# Patient Record
Sex: MALE | Race: WHITE | NOT HISPANIC OR LATINO
[De-identification: names, ages, dates, MRNs, and addresses within clinical notes are randomized per-mention and may not be internally consistent; named-entity substitution may affect disease eponyms.]

---

## 2017-01-03 ENCOUNTER — RESULT REVIEW (OUTPATIENT)
Age: 62
End: 2017-01-03

## 2017-01-04 ENCOUNTER — APPOINTMENT (OUTPATIENT)
Dept: NEUROLOGY | Facility: CLINIC | Age: 62
End: 2017-01-04

## 2017-01-04 ENCOUNTER — APPOINTMENT (OUTPATIENT)
Dept: HEMATOLOGY ONCOLOGY | Facility: CLINIC | Age: 62
End: 2017-01-04

## 2017-01-04 ENCOUNTER — APPOINTMENT (OUTPATIENT)
Dept: MRI IMAGING | Facility: IMAGING CENTER | Age: 62
End: 2017-01-04

## 2017-01-04 ENCOUNTER — OUTPATIENT (OUTPATIENT)
Dept: OUTPATIENT SERVICES | Facility: HOSPITAL | Age: 62
LOS: 1 days | Discharge: HOME | End: 2017-01-04

## 2017-01-04 ENCOUNTER — OUTPATIENT (OUTPATIENT)
Dept: OUTPATIENT SERVICES | Facility: HOSPITAL | Age: 62
LOS: 1 days | End: 2017-01-04
Payer: COMMERCIAL

## 2017-01-04 VITALS — RESPIRATION RATE: 16 BRPM | WEIGHT: 150 LBS | BODY MASS INDEX: 20.32 KG/M2 | HEIGHT: 72 IN

## 2017-01-04 DIAGNOSIS — D64.9 ANEMIA, UNSPECIFIED: ICD-10-CM

## 2017-01-04 DIAGNOSIS — Z00.8 ENCOUNTER FOR OTHER GENERAL EXAMINATION: ICD-10-CM

## 2017-01-04 LAB
BASOPHILS # BLD AUTO: 0 K/UL — SIGNIFICANT CHANGE UP (ref 0–0.2)
BASOPHILS NFR BLD AUTO: 0.6 % — SIGNIFICANT CHANGE UP (ref 0–2)
EOSINOPHIL # BLD AUTO: 0.1 K/UL — SIGNIFICANT CHANGE UP (ref 0–0.5)
EOSINOPHIL NFR BLD AUTO: 1.9 % — SIGNIFICANT CHANGE UP (ref 0–6)
HCT VFR BLD CALC: 38.9 % — LOW (ref 39–50)
HGB BLD-MCNC: 13.3 G/DL — SIGNIFICANT CHANGE UP (ref 13–17)
LYMPHOCYTES # BLD AUTO: 1.4 K/UL — SIGNIFICANT CHANGE UP (ref 1–3.3)
LYMPHOCYTES # BLD AUTO: 35.5 % — SIGNIFICANT CHANGE UP (ref 13–44)
MCHC RBC-ENTMCNC: 31.5 PG — SIGNIFICANT CHANGE UP (ref 27–34)
MCHC RBC-ENTMCNC: 34.1 GM/DL — SIGNIFICANT CHANGE UP (ref 32–36)
MCV RBC AUTO: 92.4 FL — SIGNIFICANT CHANGE UP (ref 80–100)
MONOCYTES # BLD AUTO: 0.4 K/UL — SIGNIFICANT CHANGE UP (ref 0–0.9)
MONOCYTES NFR BLD AUTO: 10.5 % — SIGNIFICANT CHANGE UP (ref 2–14)
NEUTROPHILS # BLD AUTO: 2 K/UL — SIGNIFICANT CHANGE UP (ref 1.8–7.4)
NEUTROPHILS NFR BLD AUTO: 51.5 % — SIGNIFICANT CHANGE UP (ref 43–77)
PLATELET # BLD AUTO: 209 K/UL — SIGNIFICANT CHANGE UP (ref 150–400)
RBC # BLD: 4.21 M/UL — SIGNIFICANT CHANGE UP (ref 4.2–5.8)
RBC # FLD: 12.8 % — SIGNIFICANT CHANGE UP (ref 10.3–14.5)
WBC # BLD: 3.8 K/UL — SIGNIFICANT CHANGE UP (ref 3.8–10.5)
WBC # FLD AUTO: 3.8 K/UL — SIGNIFICANT CHANGE UP (ref 3.8–10.5)

## 2017-01-04 PROCEDURE — 70553 MRI BRAIN STEM W/O & W/DYE: CPT

## 2017-01-04 PROCEDURE — A9585: CPT

## 2017-01-04 RX ORDER — AMANTADINE HYDROCHLORIDE 100 MG/1
100 CAPSULE ORAL TWICE DAILY
Qty: 60 | Refills: 1 | Status: ACTIVE | COMMUNITY
Start: 2017-01-04 | End: 1900-01-01

## 2017-01-05 ENCOUNTER — CHART COPY (OUTPATIENT)
Age: 62
End: 2017-01-05

## 2017-01-10 ENCOUNTER — CHART COPY (OUTPATIENT)
Age: 62
End: 2017-01-10

## 2017-04-06 ENCOUNTER — EMERGENCY (EMERGENCY)
Facility: HOSPITAL | Age: 62
LOS: 1 days | End: 2017-04-06
Payer: COMMERCIAL

## 2017-04-06 ENCOUNTER — INPATIENT (INPATIENT)
Facility: HOSPITAL | Age: 62
LOS: 7 days | Discharge: NURSING, REHAB OR LONG-TERM CARE | DRG: 917 | End: 2017-04-14
Attending: PSYCHIATRY & NEUROLOGY | Admitting: PSYCHIATRY & NEUROLOGY
Payer: COMMERCIAL

## 2017-04-06 ENCOUNTER — TRANSCRIPTION ENCOUNTER (OUTPATIENT)
Age: 62
End: 2017-04-06

## 2017-04-06 VITALS
DIASTOLIC BLOOD PRESSURE: 84 MMHG | HEART RATE: 86 BPM | OXYGEN SATURATION: 100 % | RESPIRATION RATE: 18 BRPM | TEMPERATURE: 98 F | SYSTOLIC BLOOD PRESSURE: 138 MMHG

## 2017-04-06 DIAGNOSIS — G93.9 DISORDER OF BRAIN, UNSPECIFIED: ICD-10-CM

## 2017-04-06 DIAGNOSIS — I82.409 ACUTE EMBOLISM AND THROMBOSIS OF UNSPECIFIED DEEP VEINS OF UNSPECIFIED LOWER EXTREMITY: ICD-10-CM

## 2017-04-06 DIAGNOSIS — T42.0X1A POISONING BY HYDANTOIN DERIVATIVES, ACCIDENTAL (UNINTENTIONAL), INITIAL ENCOUNTER: ICD-10-CM

## 2017-04-06 LAB
ANION GAP SERPL CALC-SCNC: 12 MMOL/L — SIGNIFICANT CHANGE UP (ref 5–17)
ANION GAP SERPL CALC-SCNC: 8 MMOL/L — SIGNIFICANT CHANGE UP (ref 5–17)
APTT BLD: 21.3 SEC — LOW (ref 27.5–37.4)
BUN SERPL-MCNC: 6 MG/DL — LOW (ref 7–23)
BUN SERPL-MCNC: 8 MG/DL — SIGNIFICANT CHANGE UP (ref 7–23)
CALCIUM SERPL-MCNC: 8.8 MG/DL — SIGNIFICANT CHANGE UP (ref 8.4–10.5)
CALCIUM SERPL-MCNC: 9 MG/DL — SIGNIFICANT CHANGE UP (ref 8.4–10.5)
CHLORIDE SERPL-SCNC: 103 MMOL/L — SIGNIFICANT CHANGE UP (ref 96–108)
CHLORIDE SERPL-SCNC: 105 MMOL/L — SIGNIFICANT CHANGE UP (ref 96–108)
CO2 SERPL-SCNC: 24 MMOL/L — SIGNIFICANT CHANGE UP (ref 22–31)
CO2 SERPL-SCNC: 29 MMOL/L — SIGNIFICANT CHANGE UP (ref 22–31)
CREAT SERPL-MCNC: 0.48 MG/DL — LOW (ref 0.5–1.3)
CREAT SERPL-MCNC: 0.56 MG/DL — SIGNIFICANT CHANGE UP (ref 0.5–1.3)
GLUCOSE SERPL-MCNC: 119 MG/DL — HIGH (ref 70–99)
GLUCOSE SERPL-MCNC: 122 MG/DL — HIGH (ref 70–99)
HCT VFR BLD CALC: 25.3 % — LOW (ref 39–50)
HGB BLD-MCNC: 7.4 G/DL — LOW (ref 13–17)
INR BLD: 0.94 RATIO — SIGNIFICANT CHANGE UP (ref 0.88–1.16)
MCHC RBC-ENTMCNC: 29.4 GM/DL — LOW (ref 32–36)
MCHC RBC-ENTMCNC: 31.4 PG — SIGNIFICANT CHANGE UP (ref 27–34)
MCV RBC AUTO: 107 FL — HIGH (ref 80–100)
PLATELET # BLD AUTO: 102 K/UL — LOW (ref 150–400)
POTASSIUM SERPL-MCNC: 4.2 MMOL/L — SIGNIFICANT CHANGE UP (ref 3.5–5.3)
POTASSIUM SERPL-MCNC: 8.3 MMOL/L — CRITICAL HIGH (ref 3.5–5.3)
POTASSIUM SERPL-SCNC: 4.2 MMOL/L — SIGNIFICANT CHANGE UP (ref 3.5–5.3)
POTASSIUM SERPL-SCNC: 8.3 MMOL/L — CRITICAL HIGH (ref 3.5–5.3)
PROTHROM AB SERPL-ACNC: 10.2 SEC — SIGNIFICANT CHANGE UP (ref 9.8–12.7)
RBC # BLD: 2.37 M/UL — LOW (ref 4.2–5.8)
RBC # FLD: 12.2 % — SIGNIFICANT CHANGE UP (ref 10.3–14.5)
SODIUM SERPL-SCNC: 139 MMOL/L — SIGNIFICANT CHANGE UP (ref 135–145)
SODIUM SERPL-SCNC: 142 MMOL/L — SIGNIFICANT CHANGE UP (ref 135–145)
WBC # BLD: 3.8 K/UL — SIGNIFICANT CHANGE UP (ref 3.8–10.5)
WBC # FLD AUTO: 3.8 K/UL — SIGNIFICANT CHANGE UP (ref 3.8–10.5)

## 2017-04-06 PROCEDURE — 93010 ELECTROCARDIOGRAM REPORT: CPT

## 2017-04-06 PROCEDURE — 71010: CPT | Mod: 26

## 2017-04-06 PROCEDURE — 99291 CRITICAL CARE FIRST HOUR: CPT

## 2017-04-06 PROCEDURE — 70450 CT HEAD/BRAIN W/O DYE: CPT | Mod: 26,77

## 2017-04-06 PROCEDURE — 72125 CT NECK SPINE W/O DYE: CPT | Mod: 26

## 2017-04-06 PROCEDURE — 99285 EMERGENCY DEPT VISIT HI MDM: CPT | Mod: 25

## 2017-04-06 PROCEDURE — 72170 X-RAY EXAM OF PELVIS: CPT | Mod: 26

## 2017-04-06 PROCEDURE — 70450 CT HEAD/BRAIN W/O DYE: CPT | Mod: 26

## 2017-04-06 RX ORDER — AMANTADINE HCL 100 MG
100 CAPSULE ORAL DAILY
Qty: 0 | Refills: 0 | Status: DISCONTINUED | OUTPATIENT
Start: 2017-04-06 | End: 2017-04-14

## 2017-04-06 RX ORDER — ACETAMINOPHEN 500 MG
1000 TABLET ORAL ONCE
Qty: 0 | Refills: 0 | Status: COMPLETED | OUTPATIENT
Start: 2017-04-06 | End: 2017-04-06

## 2017-04-06 RX ORDER — ENOXAPARIN SODIUM 100 MG/ML
40 INJECTION SUBCUTANEOUS EVERY 24 HOURS
Qty: 0 | Refills: 0 | Status: DISCONTINUED | OUTPATIENT
Start: 2017-04-06 | End: 2017-04-14

## 2017-04-06 RX ORDER — VALPROIC ACID (AS SODIUM SALT) 250 MG/5ML
250 SOLUTION, ORAL ORAL EVERY 8 HOURS
Qty: 0 | Refills: 0 | Status: DISCONTINUED | OUTPATIENT
Start: 2017-04-06 | End: 2017-04-07

## 2017-04-06 RX ORDER — VALPROIC ACID (AS SODIUM SALT) 250 MG/5ML
250 SOLUTION, ORAL ORAL EVERY 8 HOURS
Qty: 0 | Refills: 0 | Status: DISCONTINUED | OUTPATIENT
Start: 2017-04-06 | End: 2017-04-06

## 2017-04-06 RX ADMIN — Medication 400 MILLIGRAM(S): at 20:44

## 2017-04-06 RX ADMIN — Medication 1000 MILLIGRAM(S): at 21:19

## 2017-04-06 NOTE — H&P ADULT. - MENTAL STATUS
awake and alert awake and alert, with intermittent staring episodes. When questions are asked, he often answers, although not always accurately. He has a disinhibited affect. During staring spells, he does not blink to visual threat. (+) grasp on the right.

## 2017-04-06 NOTE — ED ADULT NURSE REASSESSMENT NOTE - NS ED NURSE REASSESS COMMENT FT1
Pt sleeping in room, no complaints at this time, VSS, Iv site clear fluids running, waiting for room disposition, pt at baseline, a&ox2.

## 2017-04-06 NOTE — H&P ADULT. - PROBLEM SELECTOR PLAN 1
hold dilantin  [] free and serum dilantin levels stat and in am hold dilantin  [] free and serum dilantin levels

## 2017-04-06 NOTE — ED PROVIDER NOTE - ATTENDING CONTRIBUTION TO CARE
62f pmhx oligoastrocytoma s/p multiple resections, seizure disorder on valproic acid, HLD, transferred from OSH for worsening brain mass size on incidental head CT to r/o intracranial injury s/p mechanical fall. pt reported to have increasingly ataxia and aggression lately, today lost balance and fell, denies LOC (is currently AOx4). pt accepted for transfer by neurosurg and neuro. pt denies any pain. on PE, VSS, RRR, CTA b/l, abdo soft, non-TTP, not following commands however AOx4.

## 2017-04-06 NOTE — H&P ADULT. - RADIOLOGY RESULTS AND INTERPRETATION
CT reviewed in neuroradiology suite with the neuroradiologist. It demonstrates disease progression through the craniotomy site on the right, under the scalp. There is recurrent hyperdense tumor. There is a subdural hygroma, with contiguiity with the underlying ventricle. however, there is also right to left shift, so it is unclear how much pressure the left hemisphere is experiencing.

## 2017-04-06 NOTE — H&P ADULT. - ASSESSMENT
61 yo male with 3 weeks of lethargy and falls. No increase in size of tumor. Dilantin level elevated. 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

## 2017-04-06 NOTE — ED PROVIDER NOTE - MEDICAL DECISION MAKING DETAILS
Neuro and NSx consult, new edema/masses on CT head. no concern for herniation, is more confused per discussion with wife on phone. will repeat head CT. basic labs. TBA

## 2017-04-06 NOTE — H&P ADULT. - MOTOR
Motor: 5/5 strength in all extremities, normal tone and bulk.  Deep tendon reflexes:   Biceps right 2+. Biceps left 2+  Patella right 2+. Patella left 2+. psychomotor slowing  Motor: 5/5 strength in all extremities, normal tone and bulk.  Deep tendon reflexes:   Biceps right 2+. Biceps left 2+  Patella right 2+. Patella left 2+.

## 2017-04-06 NOTE — ED ADULT NURSE NOTE - PMH
Brain Tumor  Oligoastrocytoma- originally diagnosed 1994, s/p multiple resections, radiation therapy 2006, chemotherapy Oral Temodar  (last dose 2006) Avastatin 2014  Dyslipidemia    EP (Epilepsy)  1994 last seizure 1998  Hyperlipidemia    Renal calculus

## 2017-04-06 NOTE — ED ADULT NURSE NOTE - OBJECTIVE STATEMENT
62yr male presented to ED by EMS transfer from [facility].  Pt has a hx of brain CA. 62yr male presented to ED by EMS transfer from [facility] with 20g in LAC.  Pt has a hx of brain CA and frequent falls.  Pt reports falling today and home, reports falling more frequently, unsteady gait worsening. Pt reports HA now, no weakness, no chest pain, no sob, no paresthesias, no cough/chills, no problems with urination or bowel movements.  Per Pt's wife, Pt is often combative/ignores people around him, very disengaged at time, Wife reports Pt was confused today, thought it was July, and reports Pt is usually not like this at all, Per wife rt eye lag not his baseline, skin warm dry & intact.    HIV:

## 2017-04-06 NOTE — H&P ADULT. - HISTORY OF PRESENT ILLNESS
63 yo male with history of anasplastic glioma since 1994. S/p multiple craniectomies and chemotherapy. Currently not on chemotherapy. Follows with Dr. Dumont. For the last 2-3 weeks he has had multiple falls daily in and out of his house. Was able to work. Today his wife found him on the floor and he was not able to get up. PAtient also increasingly altered today and not responding to questions as usual. Patient went to [facility]. CT head showed a stable tumor. Phenytoin level 24.9 and depakote level 0. Patient transferred to [facility] for further workup. 61 yo male with history of right frontal anaplastic glioma since 1994. S/p multiple craniectomies and chemotherapy. Currently not on chemotherapy. Follows with Dr. Dumont. For the last 2-3 weeks he has had multiple falls daily in and out of his house. Was able to perform modified work duties.     he was staying at home. His wife left in the AM and retruned in the PM to find him on the floor and he was not able to get up. Patient also increasingly altered today and not responding to questions as usual. Intermittent twitching of the left hand and staring spells noted. Wife called 911. Patient taken first to [facility], then transferred to [facility].     CT head showed new subdural hygroma as well as some hyperdense tumor involving the right frontal lobe, likely progressive, but challenging due to complex imaging (multiple procedures).     Seizures are managed by Dr. England. However, patient is mostly responsible for taking his own meds. Moreover, wife is unsure how much of each medication he has been taking and pill bottles are mixed up, with all different drugs in a single bottle. Labs revealed Phenytoin level 24.9 and depakote level low.     Patient transferred to [facility] for further workup.

## 2017-04-06 NOTE — ED PROVIDER NOTE - OBJECTIVE STATEMENT
61yo M with frequent falls, worsening gait instability, h/o brain CA s/p craniotomy unclear further tx, family not present at bedside. Complaint of headache right now. no weakness. no CP/SOB. no abd pain. no extremity pain. no paresthesias. no dysuria. no fever/chills. Transferred from [facility] for continuity of care.    Per wife MS- often combative/ignores people around him/very disengaged at times. Normally not confused, today thought July. Was even working today at [employer]. Per wife rt eye lag not his baseline.

## 2017-04-06 NOTE — ED ADULT NURSE NOTE - PSH
S/P Craniotomy and excision of brain tumor  x 6  secondary to brain neoplasm (1994, 2005, 2007, 2011 2012,2014  s/p lithotripsy

## 2017-04-06 NOTE — H&P ADULT. - CRANIAL NERVE
Cranial Nerves: Visual acuity intact bilaterally, visual fields full to confrontation, pupils equal round and reactive to light, extraocular motion intact, facial sensation intact symmetrically in V1-V3 bilaterally, face symmetrical, hearing was intact bilaterally, head turning and shoulder shrug symmetric and there was no tongue deviation with protrusion. Cranial Nerves: Visual acuity intact bilaterally, visual fields full to confrontation, pupils equal round and reactive to light, extraocular motion intact, but difficult to get him to comply enough with the exam to evaluate for nystagmus, facial sensation intact symmetrically in V1-V3 bilaterally, face symmetrical, hearing was intact bilaterally, head turning and shoulder shrug symmetric and there was no tongue deviation with protrusion.

## 2017-04-06 NOTE — ED PROVIDER NOTE - PHYSICAL EXAMINATION
Gen: NAD, AOx2, lethargic speech, cachectic  Head: 4x4cm hematoma to rt frontal region, no hemotympanum b/l  HEENT: PERRL, oral mucosa moist, pale conjunctiva, neck supple  Lung: CTAB, no respiratory distress  CV: rrr, no murmur, Normal perfusion  Abd: soft, NTND  MSK: No edema, no visible deformities  Neuro: 5/5 UE/LE strength, +ptosis rt eye, pupils equal and reactive, unable to asses rest of CN due to patient compliance. no sensory deficits.   Skin: No rash, multiple bruises over body of different stages to body- knees/chest wall/elbows.  Psych: flat affect

## 2017-04-07 LAB
ALBUMIN SERPL ELPH-MCNC: 4 G/DL — SIGNIFICANT CHANGE UP (ref 3.3–5)
ALP SERPL-CCNC: 113 U/L — SIGNIFICANT CHANGE UP (ref 40–120)
ALT FLD-CCNC: 68 U/L — HIGH (ref 10–45)
ANION GAP SERPL CALC-SCNC: 14 MMOL/L — SIGNIFICANT CHANGE UP (ref 5–17)
AST SERPL-CCNC: 57 U/L — HIGH (ref 10–40)
BASOPHILS # BLD AUTO: 0.02 K/UL — SIGNIFICANT CHANGE UP (ref 0–0.2)
BASOPHILS NFR BLD AUTO: 0.4 % — SIGNIFICANT CHANGE UP (ref 0–2)
BILIRUB SERPL-MCNC: 0.3 MG/DL — SIGNIFICANT CHANGE UP (ref 0.2–1.2)
BUN SERPL-MCNC: 6 MG/DL — LOW (ref 7–23)
CALCIUM SERPL-MCNC: 9.3 MG/DL — SIGNIFICANT CHANGE UP (ref 8.4–10.5)
CHLORIDE SERPL-SCNC: 106 MMOL/L — SIGNIFICANT CHANGE UP (ref 96–108)
CO2 SERPL-SCNC: 24 MMOL/L — SIGNIFICANT CHANGE UP (ref 22–31)
CREAT SERPL-MCNC: 0.47 MG/DL — LOW (ref 0.5–1.3)
EOSINOPHIL # BLD AUTO: 0.04 K/UL — SIGNIFICANT CHANGE UP (ref 0–0.5)
EOSINOPHIL NFR BLD AUTO: 0.8 % — SIGNIFICANT CHANGE UP (ref 0–6)
FOLATE SERPL-MCNC: 11.7 NG/ML — SIGNIFICANT CHANGE UP (ref 4.8–24.2)
GLUCOSE SERPL-MCNC: 85 MG/DL — SIGNIFICANT CHANGE UP (ref 70–99)
HCT VFR BLD CALC: 36.9 % — LOW (ref 39–50)
HGB BLD-MCNC: 12.1 G/DL — LOW (ref 13–17)
IMM GRANULOCYTES NFR BLD AUTO: 0.2 % — SIGNIFICANT CHANGE UP (ref 0–1.5)
LYMPHOCYTES # BLD AUTO: 1.76 K/UL — SIGNIFICANT CHANGE UP (ref 1–3.3)
LYMPHOCYTES # BLD AUTO: 33.8 % — SIGNIFICANT CHANGE UP (ref 13–44)
MCHC RBC-ENTMCNC: 30.6 PG — SIGNIFICANT CHANGE UP (ref 27–34)
MCHC RBC-ENTMCNC: 32.8 GM/DL — SIGNIFICANT CHANGE UP (ref 32–36)
MCV RBC AUTO: 93.2 FL — SIGNIFICANT CHANGE UP (ref 80–100)
MONOCYTES # BLD AUTO: 0.6 K/UL — SIGNIFICANT CHANGE UP (ref 0–0.9)
MONOCYTES NFR BLD AUTO: 11.5 % — SIGNIFICANT CHANGE UP (ref 2–14)
NEUTROPHILS # BLD AUTO: 2.78 K/UL — SIGNIFICANT CHANGE UP (ref 1.8–7.4)
NEUTROPHILS NFR BLD AUTO: 53.3 % — SIGNIFICANT CHANGE UP (ref 43–77)
PHENYTOIN FREE SERPL-MCNC: 19.3 UG/ML — SIGNIFICANT CHANGE UP (ref 10–20)
PHENYTOIN FREE SERPL-MCNC: 21.5 UG/ML — HIGH (ref 10–20)
PLATELET # BLD AUTO: 230 K/UL — SIGNIFICANT CHANGE UP (ref 150–400)
POTASSIUM SERPL-MCNC: 4.3 MMOL/L — SIGNIFICANT CHANGE UP (ref 3.5–5.3)
POTASSIUM SERPL-SCNC: 4.3 MMOL/L — SIGNIFICANT CHANGE UP (ref 3.5–5.3)
PROT SERPL-MCNC: 6.8 G/DL — SIGNIFICANT CHANGE UP (ref 6–8.3)
RBC # BLD: 3.96 M/UL — LOW (ref 4.2–5.8)
RBC # FLD: 13.9 % — SIGNIFICANT CHANGE UP (ref 10.3–14.5)
SODIUM SERPL-SCNC: 144 MMOL/L — SIGNIFICANT CHANGE UP (ref 135–145)
VALPROATE SERPL-MCNC: 13 UG/ML — LOW (ref 50–100)
VALPROATE SERPL-MCNC: 22 UG/ML — LOW (ref 50–100)
VIT B12 SERPL-MCNC: 458 PG/ML — SIGNIFICANT CHANGE UP (ref 243–894)
WBC # BLD: 5.21 K/UL — SIGNIFICANT CHANGE UP (ref 3.8–10.5)
WBC # FLD AUTO: 5.21 K/UL — SIGNIFICANT CHANGE UP (ref 3.8–10.5)

## 2017-04-07 PROCEDURE — 99223 1ST HOSP IP/OBS HIGH 75: CPT | Mod: GC

## 2017-04-07 PROCEDURE — 70553 MRI BRAIN STEM W/O & W/DYE: CPT | Mod: 26

## 2017-04-07 RX ORDER — VALPROIC ACID (AS SODIUM SALT) 250 MG/5ML
800 SOLUTION, ORAL ORAL ONCE
Qty: 0 | Refills: 0 | Status: COMPLETED | OUTPATIENT
Start: 2017-04-07 | End: 2017-04-07

## 2017-04-07 RX ORDER — VALPROIC ACID (AS SODIUM SALT) 250 MG/5ML
500 SOLUTION, ORAL ORAL EVERY 12 HOURS
Qty: 0 | Refills: 0 | Status: DISCONTINUED | OUTPATIENT
Start: 2017-04-07 | End: 2017-04-10

## 2017-04-07 RX ADMIN — Medication 54 MILLIGRAM(S): at 14:23

## 2017-04-07 RX ADMIN — Medication 27.5 MILLIGRAM(S): at 18:54

## 2017-04-07 RX ADMIN — ENOXAPARIN SODIUM 40 MILLIGRAM(S): 100 INJECTION SUBCUTANEOUS at 05:40

## 2017-04-07 RX ADMIN — Medication 250 MILLIGRAM(S): at 05:40

## 2017-04-07 NOTE — OCCUPATIONAL THERAPY INITIAL EVALUATION ADULT - ORIENTATION, REHAB EVAL
Pt oriented to person and situation; required verbal prompts and multiple choices to obtain month, year and place./time/situation/person

## 2017-04-07 NOTE — SWALLOW BEDSIDE ASSESSMENT ADULT - SWALLOW EVAL: DIAGNOSIS
Patient presents with: 1) bailee-pharyngeal dysphagia with s/s suggestive of pharyngeal residue and laryngeal penetration/aspiration on solids and nectar thick fluids 2) Cognitive deficits Patient known to this service. Had a prior hx of dysphagia after crani for tumor resection. Now presents with: 1) bailee-pharyngeal dysphagia with s/s suggestive of pharyngeal residue and laryngeal penetration/aspiration on solids and nectar thick fluids 2) Cognitive deficits

## 2017-04-07 NOTE — DISCHARGE NOTE ADULT - MEDICATION SUMMARY - MEDICATIONS TO STOP TAKING
I will STOP taking the medications listed below when I get home from the hospital:    phenytoin 100 mg oral capsule  -- 4 tab(s) by mouth every 48 hours    phenytoin 100 mg oral capsule  -- 5 tab(s) by mouth every 48 hours

## 2017-04-07 NOTE — OCCUPATIONAL THERAPY INITIAL EVALUATION ADULT - PERTINENT HX OF CURRENT PROBLEM, REHAB EVAL
63 yo M with hx of anasplastic glioma since 1994. S/p multiple craniectomies and chemotherapy. Currently not on chemotherapy. Follows with Dr. Dumont. For last 2-3 weeks he has had multiple falls daily in and out of house, Was able to work. Today wife found him on floor and pt wasn't able to get up. Pt also increasingly altered today and not responding to questions as usual. Pt went to [facility]. CT head showed a stable tumor. (cont. below)

## 2017-04-07 NOTE — OCCUPATIONAL THERAPY INITIAL EVALUATION ADULT - GENERAL OBSERVATIONS, REHAB EVAL
Pt received seated in chair, +IVL, A+Ox2-3 with verbal cues and multiple choice provided to ascertain orientation.

## 2017-04-07 NOTE — DISCHARGE NOTE ADULT - CARE PROVIDERS DIRECT ADDRESSES
[email or web address]

## 2017-04-07 NOTE — OCCUPATIONAL THERAPY INITIAL EVALUATION ADULT - LEVEL OF CONSCIOUSNESS, OT EVAL
confused/alert/pt slightly confused required minimal redirection to remain on task throughout session

## 2017-04-07 NOTE — DISCHARGE NOTE ADULT - CARE PLAN
Principal Discharge DX:	Brain tumor  Goal:	Symptom management  Instructions for follow-up, activity and diet:	Continue to follow up with Dr. Anderson outpatient. MRI overall stable. Changed seizure medication for partial status and compliance issues. Take medications as prescribed above.  Secondary Diagnosis:	Noncompliance  Secondary Diagnosis:	Status epilepticus due to complex partial seizure Principal Discharge DX:	Brain tumor  Goal:	Symptom management  Instructions for follow-up, activity and diet:	Continue to follow up with Dr. Anderson outpatient. MRI overall stable. Changed seizure medication for partial status and compliance issues. Take medications as prescribed above.  Secondary Diagnosis:	Status epilepticus due to complex partial seizure  Goal:	seizure control  Instructions for follow-up, activity and diet:	Please take Depakote DR 500mg PO twice daily.  Please follow up serum depakote level in 5-7 days and increase dose as needed based on serum level.

## 2017-04-07 NOTE — OCCUPATIONAL THERAPY INITIAL EVALUATION ADULT - LIVES WITH, PROFILE
As per pt, lives in 2 story private home with spouse no steps to enter with 11 steps to ascend with L side up rail. Bedroom and bathroom located on 2nd floor/spouse

## 2017-04-07 NOTE — PATIENT PROFILE ADULT. - VISION (WITH CORRECTIVE LENSES IF THE PATIENT USUALLY WEARS THEM):
Reading glass/Partially impaired: cannot see medication labels or newsprint, but can see obstacles in path, and the surrounding layout; can count fingers at arm's length

## 2017-04-07 NOTE — OCCUPATIONAL THERAPY INITIAL EVALUATION ADULT - ADDITIONAL COMMENTS
Phenytoin level 24.9 and depakote level 0. Pt transferred to [facility] for further workup.  CT Brain 4/6/17: No significant interval change since 4/6/2017 1540. If clinically indicated, a short-term follow-up or MRI may be obtained for further evaluation. Spoke to pt's wife Rita Jhaveri [phone]  Phenytoin level 24.9 and depakote level 0. Pt transferred to [facility] for further workup.  CT Brain 4/6/17: No significant interval change since 4/6/2017 1540. If clinically indicated, a short-term follow-up or MRI may be obtained for further evaluation.

## 2017-04-07 NOTE — SWALLOW BEDSIDE ASSESSMENT ADULT - PHARYNGEAL PHASE
intermittent successive swallow intermittent successive swallow. When pt was following a hard solid with nectar thick liquid wash, noted subtle post prandial throat clear

## 2017-04-07 NOTE — DISCHARGE NOTE ADULT - PLAN OF CARE
Symptom management Continue to follow up with Dr. Anderson outpatient. MRI overall stable. Changed seizure medication for partial status and compliance issues. Take medications as prescribed above. seizure control Please take Depakote DR 500mg PO twice daily.  Please follow up serum depakote level in 5-7 days and increase dose as needed based on serum level.

## 2017-04-07 NOTE — DISCHARGE NOTE ADULT - CARE PROVIDER_API CALL
Arturo Anderson), Neurology  [address] [place] [address]  Phone: [phone]  Fax: [phone]

## 2017-04-07 NOTE — SWALLOW BEDSIDE ASSESSMENT ADULT - SLP GENERAL OBSERVATIONS
Pt awake, alert, Ox3, flat affect, attention fair to poor, appears mildly impulsive when self-feeding, impaired ability to follow 1 step commands and provide hx.  Frequently did not attempt to respond to questions. When did respond to questions, verbal output tended to be brief

## 2017-04-07 NOTE — DISCHARGE NOTE ADULT - NS AS DC STROKE ED MATERIALS
Stroke Warning Signs and Symptoms/Risk Factors for Stroke/Prescribed Medications/Stroke Education Booklet/Need for Followup After Discharge/Call 911 for Stroke

## 2017-04-07 NOTE — DISCHARGE NOTE ADULT - PATIENT PORTAL LINK FT
“You can access the FollowHealth Patient Portal, offered by [facility], by registering with the following website: [email or web address]”

## 2017-04-07 NOTE — DISCHARGE NOTE ADULT - MEDICATION SUMMARY - MEDICATIONS TO CHANGE
I will SWITCH the dose or number of times a day I take the medications listed below when I get home from the hospital:    valproic acid 250 mg oral capsule  -- 1 cap(s) by mouth 3 times a day

## 2017-04-07 NOTE — OCCUPATIONAL THERAPY INITIAL EVALUATION ADULT - ANTICIPATED DISCHARGE DISPOSITION, OT EVAL
rehabilitation facility/Acute Rehab Acute Rehab; [place] and Acute not available Sub Acute Rehab recommended/rehabilitation facility

## 2017-04-07 NOTE — SWALLOW BEDSIDE ASSESSMENT ADULT - ORAL PREPARATORY PHASE
minimal difficulty with self feeding with subsequent reduced oral grading dur to poor hand to mouth coordination

## 2017-04-07 NOTE — DISCHARGE NOTE ADULT - HOSPITAL COURSE
Initial evaluation:  63 yo male with history of right frontal anaplastic glioma since 1994. S/p multiple craniectomies and chemotherapy. Currently not on chemotherapy. Follows with Dr. Dumont. For the last 2-3 weeks he has had multiple falls daily in and out of his house. Was able to perform modified work duties.     he was staying at home. His wife left in the AM and retruned in the PM to find him on the floor and he was not able to get up. Patient also increasingly altered today and not responding to questions as usual. Intermittent twitching of the left hand and staring spells noted. Wife called 911. Patient taken first to [facility], then transferred to [facility].     CT head showed new subdural hygroma as well as some hyperdense tumor involving the right frontal lobe, likely progressive, but challenging due to complex imaging (multiple procedures).     Seizures are managed by Dr. England. However, patient is mostly responsible for taking his own meds. Moreover, wife is unsure how much of each medication he has been taking and pill bottles are mixed up, with all different drugs in a single bottle. Labs revealed Phenytoin level 24.9 and depakote level low.     Patient transferred to [facility] for further workup.  His dilantin was held and his levels trended down. To cover him, we increased depacon to 500 every 12 hours and continued to monitor levels of this. His mental status accordingly improved.     Imaging:  Patient had MRI which revealed possible right frontal brain abscess and tumor progression with leptomeningeal enhancement and B/L subdural hydroma.     With regards to the abscess, neurosurgery was consulted to comment and they did not feel that this was an abscess. ID recommended obtaining blood cultures and a echo. Initial evaluation:  61 yo male with history of right frontal anaplastic glioma since 1994. S/p multiple craniectomies and chemotherapy. Currently not on chemotherapy. Follows with Dr. Dumont. For the last 2-3 weeks he has had multiple falls daily in and out of his house. Was able to perform modified work duties.     he was staying at home. His wife left in the AM and retruned in the PM to find him on the floor and he was not able to get up. Patient also increasingly altered today and not responding to questions as usual. Intermittent twitching of the left hand and staring spells noted. Wife called 911. Patient taken first to [facility], then transferred to [facility].     CT head showed new subdural hygroma as well as some hyperdense tumor involving the right frontal lobe, likely progressive, but challenging due to complex imaging (multiple procedures).     Seizures are managed by Dr. England. However, patient is mostly responsible for taking his own meds. Moreover, wife is unsure how much of each medication he has been taking and pill bottles are mixed up, with all different drugs in a single bottle. Labs revealed Phenytoin level 24.9 and depakote level low.     Patient transferred to [facility] for further workup.  His dilantin was held and his levels trended down. To cover him, we increased depacon to 500 every 12 hours and continued to monitor levels of this. His mental status accordingly improved. He did not appear to have any seizures on EEG or clinically. EEG showed slowing in right worse than left and breech artifact.     Imaging:  Patient had MRI which revealed possible right frontal brain abscess and tumor progression with leptomeningeal enhancement and B/L subdural hydroma. Similar tumor enhancement.    With regards to the abscess, neurosurgery was consulted to comment and they did not feel that this was an abscess therefore the merem and vanc were stopped.     Physical therapy determined that the patient would be best suited for acute rehab.   Patient to continue on dysphagia diet with pureed-honey consistency fluids. Initial evaluation:  63 yo male with history of right frontal anaplastic glioma since 1994. S/p multiple craniectomies and chemotherapy. Currently not on chemotherapy. Follows with Dr. Dumont. For the last 2-3 weeks he has had multiple falls daily in and out of his house. Was able to perform modified work duties.     he was staying at home. His wife left in the AM and retruned in the PM to find him on the floor and he was not able to get up. Patient also increasingly altered today and not responding to questions as usual. Intermittent twitching of the left hand and staring spells noted. Wife called 911. Patient taken first to [facility], then transferred to [facility].     CT head showed new subdural hygroma as well as some hyperdense tumor involving the right frontal lobe, likely progressive, but challenging due to complex imaging (multiple procedures).     Seizures are managed by Dr. England. However, patient is mostly responsible for taking his own meds. Moreover, wife is unsure how much of each medication he has been taking and pill bottles are mixed up, with all different drugs in a single bottle. Labs revealed Phenytoin level 24.9 and depakote level low.     Patient transferred to [facility] for further workup.  His dilantin was held and his levels trended down. To cover him, we increased depacon to 500 every 12 hours and continued to monitor levels of this. His mental status accordingly improved. He did not appear to have any seizures on EEG or clinically. EEG showed slowing in right worse than left and breech artifact.     Imaging:  Patient had MRI which revealed possible right frontal brain abscess and tumor progression with leptomeningeal enhancement and B/L subdural hydroma. Similar tumor enhancement.    With regards to the abscess, neurosurgery was consulted to comment and they did not feel that this was an abscess therefore the merem and vanc were stopped.     Physical therapy determined that the patient would be best suited for acute rehab.   Patient to continue on dysphagia diet with pureed-honey consistency fluids.     Patient will continue on depakote DR 500mg BID.

## 2017-04-07 NOTE — OCCUPATIONAL THERAPY INITIAL EVALUATION ADULT - PLANNED THERAPY INTERVENTIONS, OT EVAL
IADL retraining/strengthening/transfer training/balance training/cognitive, visual perceptual/bed mobility training/fine motor coordination training/ADL retraining

## 2017-04-07 NOTE — DISCHARGE NOTE ADULT - MEDICATION SUMMARY - MEDICATIONS TO TAKE
I will START or STAY ON the medications listed below when I get home from the hospital:    acetaminophen 325 mg oral tablet  -- 2 tab(s) by mouth every 6 hours, As needed, pain  -- Indication: For Headache    Depakote 500 mg oral delayed release tablet  -- 1 tab(s) by mouth every 12 hours  -- Indication: For Status epilepticus due to complex partial seizure    amantadine 100 mg oral capsule  -- 1 cap(s) by mouth once a day  -- Indication: For Brain mass

## 2017-04-07 NOTE — OCCUPATIONAL THERAPY INITIAL EVALUATION ADULT - AMBULATORY DEVICES NEEDED
yes/Pt previously ambulatory with rolling walker; drove within community yes/Pt previously ambulatory with rolling walker; driven to work by others

## 2017-04-07 NOTE — OCCUPATIONAL THERAPY INITIAL EVALUATION ADULT - NS ASR FOLLOW COMMAND OT EVAL
50% of the time/requires verbal and tactile prompts to redirect and maximize command follow/able to follow single-step instructions

## 2017-04-07 NOTE — SWALLOW BEDSIDE ASSESSMENT ADULT - ORAL PHASE
Oral transit time noted to be  2 seconds./Delayed oral transit time Oral transit time noted to be 2-3  seconds. Trace lingual-palatal residue noted post swallow/Delayed oral transit time Within functional limits Delayed oral transit time/Oral transit time noted to be up to 15-18   seconds.

## 2017-04-07 NOTE — SWALLOW BEDSIDE ASSESSMENT ADULT - ASR SWALLOW ASPIRATION MONITOR
fever/pneumonia/cough/gurgly voice/upper respiratory infection/Monitor for s/s aspiration/laryngeal penetration. If noted:  D/C p.o. intake, provide non-oral nutrition/hydration/meds, and contact this service @ [phone]/change of breathing pattern/throat clearing

## 2017-04-08 DIAGNOSIS — G40.209 LOCALIZATION-RELATED (FOCAL) (PARTIAL) SYMPTOMATIC EPILEPSY AND EPILEPTIC SYNDROMES WITH COMPLEX PARTIAL SEIZURES, NOT INTRACTABLE, WITHOUT STATUS EPILEPTICUS: ICD-10-CM

## 2017-04-08 DIAGNOSIS — Z91.19 PATIENT'S NONCOMPLIANCE WITH OTHER MEDICAL TREATMENT AND REGIMEN: ICD-10-CM

## 2017-04-08 DIAGNOSIS — C80.1 MALIGNANT (PRIMARY) NEOPLASM, UNSPECIFIED: ICD-10-CM

## 2017-04-08 LAB
ANION GAP SERPL CALC-SCNC: 14 MMOL/L — SIGNIFICANT CHANGE UP (ref 5–17)
BUN SERPL-MCNC: 10 MG/DL — SIGNIFICANT CHANGE UP (ref 7–23)
CALCIUM SERPL-MCNC: 9.3 MG/DL — SIGNIFICANT CHANGE UP (ref 8.4–10.5)
CHLORIDE SERPL-SCNC: 104 MMOL/L — SIGNIFICANT CHANGE UP (ref 96–108)
CO2 SERPL-SCNC: 24 MMOL/L — SIGNIFICANT CHANGE UP (ref 22–31)
CREAT SERPL-MCNC: 0.49 MG/DL — LOW (ref 0.5–1.3)
GLUCOSE SERPL-MCNC: 80 MG/DL — SIGNIFICANT CHANGE UP (ref 70–99)
HCT VFR BLD CALC: 37.9 % — LOW (ref 39–50)
HGB BLD-MCNC: 12.7 G/DL — LOW (ref 13–17)
MCHC RBC-ENTMCNC: 31.3 PG — SIGNIFICANT CHANGE UP (ref 27–34)
MCHC RBC-ENTMCNC: 33.5 GM/DL — SIGNIFICANT CHANGE UP (ref 32–36)
MCV RBC AUTO: 93.3 FL — SIGNIFICANT CHANGE UP (ref 80–100)
PLATELET # BLD AUTO: 244 K/UL — SIGNIFICANT CHANGE UP (ref 150–400)
POTASSIUM SERPL-MCNC: 4.1 MMOL/L — SIGNIFICANT CHANGE UP (ref 3.5–5.3)
POTASSIUM SERPL-SCNC: 4.1 MMOL/L — SIGNIFICANT CHANGE UP (ref 3.5–5.3)
RBC # BLD: 4.06 M/UL — LOW (ref 4.2–5.8)
RBC # FLD: 14.1 % — SIGNIFICANT CHANGE UP (ref 10.3–14.5)
SODIUM SERPL-SCNC: 142 MMOL/L — SIGNIFICANT CHANGE UP (ref 135–145)
VALPROATE SERPL-MCNC: 13 UG/ML — LOW (ref 50–100)
WBC # BLD: 4.77 K/UL — SIGNIFICANT CHANGE UP (ref 3.8–10.5)
WBC # FLD AUTO: 4.77 K/UL — SIGNIFICANT CHANGE UP (ref 3.8–10.5)

## 2017-04-08 PROCEDURE — 99233 SBSQ HOSP IP/OBS HIGH 50: CPT | Mod: GC

## 2017-04-08 PROCEDURE — 95957 EEG DIGITAL ANALYSIS: CPT | Mod: 26

## 2017-04-08 PROCEDURE — 99254 IP/OBS CNSLTJ NEW/EST MOD 60: CPT | Mod: GC

## 2017-04-08 PROCEDURE — 74230 X-RAY XM SWLNG FUNCJ C+: CPT | Mod: 26

## 2017-04-08 PROCEDURE — 95819 EEG AWAKE AND ASLEEP: CPT | Mod: 26

## 2017-04-08 RX ORDER — VANCOMYCIN HCL 1 G
1500 VIAL (EA) INTRAVENOUS ONCE
Qty: 0 | Refills: 0 | Status: COMPLETED | OUTPATIENT
Start: 2017-04-08 | End: 2017-04-08

## 2017-04-08 RX ORDER — MEROPENEM 1 G/30ML
2000 INJECTION INTRAVENOUS EVERY 8 HOURS
Qty: 0 | Refills: 0 | Status: DISCONTINUED | OUTPATIENT
Start: 2017-04-08 | End: 2017-04-10

## 2017-04-08 RX ORDER — VANCOMYCIN HCL 1 G
VIAL (EA) INTRAVENOUS
Qty: 0 | Refills: 0 | Status: DISCONTINUED | OUTPATIENT
Start: 2017-04-08 | End: 2017-04-08

## 2017-04-08 RX ORDER — MEROPENEM 1 G/30ML
INJECTION INTRAVENOUS
Qty: 0 | Refills: 0 | Status: DISCONTINUED | OUTPATIENT
Start: 2017-04-08 | End: 2017-04-10

## 2017-04-08 RX ORDER — MEROPENEM 1 G/30ML
2000 INJECTION INTRAVENOUS ONCE
Qty: 0 | Refills: 0 | Status: COMPLETED | OUTPATIENT
Start: 2017-04-08 | End: 2017-04-08

## 2017-04-08 RX ORDER — VANCOMYCIN HCL 1 G
1250 VIAL (EA) INTRAVENOUS EVERY 12 HOURS
Qty: 0 | Refills: 0 | Status: DISCONTINUED | OUTPATIENT
Start: 2017-04-08 | End: 2017-04-10

## 2017-04-08 RX ORDER — VANCOMYCIN HCL 1 G
1500 VIAL (EA) INTRAVENOUS EVERY 8 HOURS
Qty: 0 | Refills: 0 | Status: DISCONTINUED | OUTPATIENT
Start: 2017-04-08 | End: 2017-04-08

## 2017-04-08 RX ADMIN — Medication 300 MILLIGRAM(S): at 15:14

## 2017-04-08 RX ADMIN — MEROPENEM 200 MILLIGRAM(S): 1 INJECTION INTRAVENOUS at 21:48

## 2017-04-08 RX ADMIN — Medication 27.5 MILLIGRAM(S): at 17:27

## 2017-04-08 RX ADMIN — Medication 27.5 MILLIGRAM(S): at 05:09

## 2017-04-08 RX ADMIN — Medication 100 MILLIGRAM(S): at 13:25

## 2017-04-08 RX ADMIN — ENOXAPARIN SODIUM 40 MILLIGRAM(S): 100 INJECTION SUBCUTANEOUS at 05:09

## 2017-04-08 RX ADMIN — MEROPENEM 200 MILLIGRAM(S): 1 INJECTION INTRAVENOUS at 16:47

## 2017-04-08 NOTE — SWALLOW VFSS/MBS ASSESSMENT ADULT - LARYNGEAL PENETRATION DURING THE SWALLOW - SILENT
Trace/shallow, over the laryngeal surface of the arytenoids, with retrieval Trace/shallow, over laryngeal surface of the arytenoids, with retrieval Trace/shallow, over the laryngeal surface of the arytenoid, with retrieval Mild/Trace/over the laryngeal surface of the arytenoids, with incomplete retrieval

## 2017-04-08 NOTE — PHYSICAL THERAPY INITIAL EVALUATION ADULT - IMPAIRMENTS CONTRIBUTING TO GAIT DEVIATIONS, PT EVAL
cognition/impaired coordination/impaired postural control/decreased strength/impaired motor control/impaired balance

## 2017-04-08 NOTE — PHYSICAL THERAPY INITIAL EVALUATION ADULT - FOLLOWS COMMANDS/ANSWERS QUESTIONS, REHAB EVAL
75% of the time/able to follow single-step instructions/requires increased time to follow commands & answer questions

## 2017-04-08 NOTE — SWALLOW VFSS/MBS ASSESSMENT ADULT - DIAGNOSTIC IMPRESSIONS
Pt known to service with known h/o oropharyngeal dysphagia currently presents with an oropharyngeal dysphagia notable for reduced oral management, delay in pharyngeal swallow initiation, pharyngeal residue post swallow, laryngeal penetration without retrieval for nectar-thick liquids. Pt unable to follow directions for implementation of swallow strategies due to cognitive-linguistic deficits. Additionally, Pt noted with impulsivity during self-feeding and rapid PO intake.     Disorders: reduced lingual strength/ROM/Rate of motion, reduced tongue to palate contact, reduced BOT to posterior pharyngeal wall contact, delay in trigger of the swallow reflex, reduced hyo-laryngeal excursion, reduced laryngeal closure, reduced pharyngeal contractility, reduced supraglottic sensation. Pt known to service with known h/o oropharyngeal dysphagia currently presents with an oropharyngeal dysphagia notable for reduced oral management, delay in pharyngeal swallow initiation, pharyngeal residue post swallow (worse with solids), trace shallow laryngeal penetration with retrieval for thin and thick purees and honey-thick liquids, penetration without retrieval for nectar-thick liquids. Pt unable to follow directions for implementation of swallow strategies due to cognitive-linguistic deficits. Pt not spontaneously triggering a reswallow to clear residue post chewable solids, also not following directions for cued reswallow. Additionally, Pt noted with impulsivity during self-feeding and rapid PO intake.     Disorders: reduced lingual strength/ROM/Rate of motion, reduced tongue to palate contact, reduced BOT to posterior pharyngeal wall contact, delay in trigger of the swallow reflex, reduced hyo-laryngeal excursion, reduced laryngeal closure, reduced pharyngeal contractility, reduced supraglottic sensation.

## 2017-04-08 NOTE — PHYSICAL THERAPY INITIAL EVALUATION ADULT - PERTINENT HX OF CURRENT PROBLEM, REHAB EVAL
Pt is a 61yo M admitted secondary to AMS & ataxia. MRI head: redemonstration right frontal cranitomy with subadjacent post operative resection cavity measuring 7.8 x 5.3 x 2.8 cm with rim enhancement and restricted effusion contiguous with a fistulous connection to the right frontal subjacent soft tissues, superimposed infectious and/or inflammatory change is not excluded...cont below...

## 2017-04-08 NOTE — SWALLOW VFSS/MBS ASSESSMENT ADULT - SLP GENERAL OBSERVATIONS
Pt received in radiology, secure in JOY chair. Pt awake and alert, cooperative, unable to consistently follow simple directions for the purposes of the assessment, thus not a candidate for swallow strategies. Additionally, Pt noted to be impulsive with self-feeding with rapid intake.

## 2017-04-08 NOTE — PHYSICAL THERAPY INITIAL EVALUATION ADULT - ADDITIONAL COMMENTS
Pt lives with his wife in a private two story home, no entry steps, + flight (with rail) to 2nd level. Prior to admission pt was independent with all functional mobility including community ambulation without an assistive device. Pt has RW from previous hospitalization/subacute rehab stay. Pt is right hand dominant, wears eye glasses for reading & distance, & works as a . Goal of therapy: return to subacute rehab facility in [facility] [place]. Pt/family aware OT recommended acute rehab.

## 2017-04-08 NOTE — SWALLOW VFSS/MBS ASSESSMENT ADULT - ROSENBEK'S PENETRATION ASPIRATION SCALE
(3) contrast remains above the vocal cords, visible residue remains (penetration) (2) contrast enters airway, remains above the vocal cords, no residue remains (penetration) (1) no aspiration, contrast does not enter airway

## 2017-04-08 NOTE — EEG REPORT - NS EEG TEXT BOX
4/8/2017    Indication: Concern for seizures      FINDINGS:  The background was continuous, spontaneously variable and reactive.  During wakefulness, the posteriorly dominant rhythm consisted of 6 Hz activity, with an amplitude to 30 uV, that attenuated to eye opening.  There was continuous irregular generalized delta and theta activity, more prominent over the right with overriding faster frequency and high amplitude (breach effect).    Sleep Background:  Drowsiness was characterized by fragmentation, attenuation, and slowing of the background activity.    Stage II sleep transients were not recorded.    Epileptiform Activity:   No epileptiform discharges were present.    Events:  No clinical events were recorded.  No seizures were recorded.    Activation Procedures:   -Hyperventilation was not performed.    -Photic stimulation was not performed.    Artifacts:  Intermittent myogenic and movement artifacts were noted.    ECG:  The heart rate on single channel ECG was predominantly between 70-80 BPM.    Compressed Spectral Array Digital Analysis    FINDINGS:  Compressed Spectral Array (CSA) data was reviewed separately and correlated with the electroencephalographic findings detailed above.  CSA showed a variable spectral pattern.  Areas of increased power in particular were reviewed in detail, and compared with the raw EEG data.  Areas of abrupt increases in spectral power were reviewed to exclude seizures, and were determined to be artifactual in nature.    The relative ratio of the power of delta range frequencies and faster frequencies remained stable over the course of the study.  There was  definitive increase in the relative power in the delta frequency spectrum apparent in the right hemisphere versus the left hemisphere.      Compressed Spectral Array (Digital Analysis) Summary/ Impression:  There was  definitive increase in the relative power in the delta frequency spectrum apparent in the right hemisphere .  Intermittent areas of increased power reviewed, without definite epileptiform activity associated on CSA.      EEG Classification / Summary:  Abnormal EEG study   -	moderate generalized slowing, more prominent over the right  -	breach effect over right  Clinical Impression:  Findings indicate moderate diffuse or multifocal cerebral dysfunction, worse on the right. Breach effect indicate of local skull defect. There were no epileptiform abnormalities recorded.        PRELIM READ BY FELLOW 4/8/2017    Indication: Concern for seizures      FINDINGS:  The background was continuous, spontaneously variable and reactive.  During wakefulness, the posteriorly dominant rhythm consisted of 6 Hz activity, with an amplitude to 30 uV, that attenuated to eye opening.  There was continuous irregular generalized delta and theta activity, more prominent over the right with overriding faster frequency and high amplitude (breach effect).    Sleep Background:  Drowsiness was characterized by fragmentation, attenuation, and slowing of the background activity.    Stage II sleep transients were not recorded.    Epileptiform Activity:   No epileptiform discharges were present.    Events:  No clinical events were recorded.  No seizures were recorded.    Activation Procedures:   -Hyperventilation was not performed.    -Photic stimulation was not performed.    Artifacts:  Intermittent myogenic and movement artifacts were noted.    ECG:  The heart rate on single channel ECG was predominantly between 70-80 BPM.    Compressed Spectral Array Digital Analysis    FINDINGS:  Compressed Spectral Array (CSA) data was reviewed separately and correlated with the electroencephalographic findings detailed above.  CSA showed a variable spectral pattern.  Areas of increased power in particular were reviewed in detail, and compared with the raw EEG data.  Areas of abrupt increases in spectral power were reviewed to exclude seizures, and were determined to be artifactual in nature.    The relative ratio of the power of delta range frequencies and faster frequencies remained stable over the course of the study.  There was  definitive increase in the relative power in the delta frequency spectrum apparent in the right hemisphere versus the left hemisphere.      Compressed Spectral Array (Digital Analysis) Summary/ Impression:  There was  definitive increase in the relative power in the delta frequency spectrum apparent in the right hemisphere .  Intermittent areas of increased power reviewed, without definite epileptiform activity associated on CSA.      EEG Classification / Summary:  Abnormal EEG study   -	moderate generalized slowing, more prominent over the right  -	breach effect over right  -	  Clinical Impression:  Findings indicate moderate diffuse or multifocal cerebral dysfunction, worse on the right. Breach effect indicate of local skull defect. There were no seizures recorded.

## 2017-04-08 NOTE — SWALLOW VFSS/MBS ASSESSMENT ADULT - ESOPHAGEAL STAGE
trace retention at the level of the UES suspected 2/2 presence of cervical osteophytes vs possible prominent cricopharyngeus trace to mild retention at the level of the UES suspected 2/2 presence of cervical osteophytes vs possible prominent cricopharyngeus reduced awareness of pharyngeal residue, not consistently following directions to initiate repeat swallow

## 2017-04-08 NOTE — SWALLOW VFSS/MBS ASSESSMENT ADULT - ADDITIONAL INFORMATION
+ small cervical osteophytes at C5-6, C6-7 + small cervical osteophytes at C5-6, C6-7  ? prominent cricopharyngeus + slight calcifications of laryngeal surface of epiglottis, thyroid and arytenoid cartilages.  + small cervical osteophytes at C5-6, C6-7.  ? prominent cricopharyngeus.

## 2017-04-08 NOTE — PHYSICAL THERAPY INITIAL EVALUATION ADULT - PRECAUTIONS/LIMITATIONS, REHAB EVAL
vision precautions/seizure precautions/swallowing precautions/aspiration precautions/fall precautions

## 2017-04-08 NOTE — PHYSICAL THERAPY INITIAL EVALUATION ADULT - DIAGNOSIS, PT EVAL
decreased functional mobility secondary to generalized weakness, impaired balance, impaired cognition

## 2017-04-08 NOTE — SWALLOW VFSS/MBS ASSESSMENT ADULT - ORAL PHASE
max spillover to the valleculae, mild lingual residue post swallow/Reduced anterior - posterior transport/Incomplete tongue to palate contact/Delayed oral transit time max spillover to the valleculae, mild lingual residue post swallow/Incomplete tongue to palate contact/Delayed oral transit time/Reduced anterior - posterior transport max spillover to the valleculae, trace to mild lingual residue/Delayed oral transit time/Reduced anterior - posterior transport/Incomplete tongue to palate contact Reduced anterior - posterior transport/max spillover to the valleculae, trace to min lingual residue/Incomplete tongue to palate contact/Delayed oral transit time intermittent piecemeal deglutition, mild oral residue/Delayed oral transit time/Reduced anterior - posterior transport/Residue in oral cavity

## 2017-04-08 NOTE — SWALLOW VFSS/MBS ASSESSMENT ADULT - NS SWALLOW VFSS REC ASPIR MON
change of breathing pattern/upper respiratory infection/Monitor for s/s aspiration/laryngeal penetration. If noted:  D/C p.o. intake, provide non-oral nutrition/hydration/meds, and contact this service @ [phone]/cough/fever/pneumonia/gurgly voice/throat clearing

## 2017-04-08 NOTE — PHYSICAL THERAPY INITIAL EVALUATION ADULT - IMPAIRED TRANSFERS: SIT/STAND, REHAB EVAL
impaired coordination/decreased strength/cognition/impaired motor control/impaired balance/impaired postural control

## 2017-04-08 NOTE — SWALLOW VFSS/MBS ASSESSMENT ADULT - RECOMMENDED FEEDING/EATING TECHNIQUES
maintain upright posture during/after eating for 30 mins/oral hygiene/position upright (90 degrees)/small sips/bites/crush medication (when feasible)

## 2017-04-08 NOTE — SWALLOW VFSS/MBS ASSESSMENT ADULT - RECOMMENDED CONSISTENCY
Dysphagia I diet with honey thick fluids. Crush meds of provide via alternate source. Dysphagia I diet with honey thick fluids: 1) Full supervision with meals, 2) Crush meds or provide via alternate source, 3) Small single bites and sips at slow rate, 4) Encourage slow rate of intake, 5) Encourage successive swallows for oral and pharyngeal clearance, 6) Alternate food and liquids to aid in oral and pharyngeal clearance, 7) Aspiration precautions. Monitor for s/s aspiration/laryngeal penetration. If noted:  D/C p.o. intake, provide non-oral nutrition/hydration/meds.

## 2017-04-08 NOTE — SWALLOW VFSS/MBS ASSESSMENT ADULT - CONSISTENCIES ADMINISTERED
puree thin/~3 oz. (some adminstered off fluoro) puree thick/~6 oz (some administered off fluoro) honey thick/~5 oz (some administered off fluoro) nectar thick/~2 oz soft solid/2 Fig Newtons, 3 Mary Grace potteries

## 2017-04-08 NOTE — SWALLOW VFSS/MBS ASSESSMENT ADULT - PHARYNGEAL PHASE COMMENTS
Degree of pharyngeal residue increased with successive trials for both soft and hard solids.  Reduced awareness of pharyngeal residue, not consistently following directions to initiate repeat swallow.

## 2017-04-08 NOTE — PHYSICAL THERAPY INITIAL EVALUATION ADULT - TRANSFER SAFETY CONCERNS NOTED: SIT/STAND, REHAB EVAL
decreased sequencing ability/decreased safety awareness/decreased balance during turns/decreased weight-shifting ability/losing balance/decreased step length

## 2017-04-08 NOTE — PHYSICAL THERAPY INITIAL EVALUATION ADULT - GENERAL OBSERVATIONS, REHAB EVAL
Pt encountered supine in bed + VEEG in place, + IV, spouse (Rita) at bedside Pt encountered supine in be, + IV, spouse (Rita) at bedside

## 2017-04-08 NOTE — PHYSICAL THERAPY INITIAL EVALUATION ADULT - PATIENT/FAMILY AGREES WITH PLAN
family aware of OT recommendation for acute rehab, family preference is subacute in [facility] family considering OT/PT recommendation for acute reha/yes

## 2017-04-09 LAB
ALBUMIN SERPL ELPH-MCNC: 3.8 G/DL — SIGNIFICANT CHANGE UP (ref 3.3–5)
ALP SERPL-CCNC: 107 U/L — SIGNIFICANT CHANGE UP (ref 40–120)
ALT FLD-CCNC: 37 U/L — SIGNIFICANT CHANGE UP (ref 10–45)
ANION GAP SERPL CALC-SCNC: 13 MMOL/L — SIGNIFICANT CHANGE UP (ref 5–17)
AST SERPL-CCNC: 20 U/L — SIGNIFICANT CHANGE UP (ref 10–40)
BILIRUB SERPL-MCNC: 0.2 MG/DL — SIGNIFICANT CHANGE UP (ref 0.2–1.2)
BUN SERPL-MCNC: 13 MG/DL — SIGNIFICANT CHANGE UP (ref 7–23)
CALCIUM SERPL-MCNC: 9.5 MG/DL — SIGNIFICANT CHANGE UP (ref 8.4–10.5)
CHLORIDE SERPL-SCNC: 101 MMOL/L — SIGNIFICANT CHANGE UP (ref 96–108)
CO2 SERPL-SCNC: 26 MMOL/L — SIGNIFICANT CHANGE UP (ref 22–31)
CREAT SERPL-MCNC: 0.72 MG/DL — SIGNIFICANT CHANGE UP (ref 0.5–1.3)
GLUCOSE SERPL-MCNC: 111 MG/DL — HIGH (ref 70–99)
HCT VFR BLD CALC: 40.2 % — SIGNIFICANT CHANGE UP (ref 39–50)
HGB BLD-MCNC: 13.2 G/DL — SIGNIFICANT CHANGE UP (ref 13–17)
MCHC RBC-ENTMCNC: 30.6 PG — SIGNIFICANT CHANGE UP (ref 27–34)
MCHC RBC-ENTMCNC: 32.8 GM/DL — SIGNIFICANT CHANGE UP (ref 32–36)
MCV RBC AUTO: 93.3 FL — SIGNIFICANT CHANGE UP (ref 80–100)
PHENYTOIN FREE SERPL-MCNC: 9.1 UG/ML — LOW (ref 10–20)
PLATELET # BLD AUTO: 291 K/UL — SIGNIFICANT CHANGE UP (ref 150–400)
POTASSIUM SERPL-MCNC: 4.5 MMOL/L — SIGNIFICANT CHANGE UP (ref 3.5–5.3)
POTASSIUM SERPL-SCNC: 4.5 MMOL/L — SIGNIFICANT CHANGE UP (ref 3.5–5.3)
PROT SERPL-MCNC: 6.7 G/DL — SIGNIFICANT CHANGE UP (ref 6–8.3)
RBC # BLD: 4.31 M/UL — SIGNIFICANT CHANGE UP (ref 4.2–5.8)
RBC # FLD: 13.9 % — SIGNIFICANT CHANGE UP (ref 10.3–14.5)
SODIUM SERPL-SCNC: 140 MMOL/L — SIGNIFICANT CHANGE UP (ref 135–145)
VALPROATE SERPL-MCNC: <2 UG/ML — LOW (ref 50–100)
WBC # BLD: 6.73 K/UL — SIGNIFICANT CHANGE UP (ref 3.8–10.5)
WBC # FLD AUTO: 6.73 K/UL — SIGNIFICANT CHANGE UP (ref 3.8–10.5)

## 2017-04-09 PROCEDURE — 95957 EEG DIGITAL ANALYSIS: CPT | Mod: 26

## 2017-04-09 PROCEDURE — 95951: CPT | Mod: 26

## 2017-04-09 PROCEDURE — 99233 SBSQ HOSP IP/OBS HIGH 50: CPT | Mod: GC

## 2017-04-09 PROCEDURE — 99232 SBSQ HOSP IP/OBS MODERATE 35: CPT

## 2017-04-09 RX ORDER — VALPROIC ACID (AS SODIUM SALT) 250 MG/5ML
500 SOLUTION, ORAL ORAL ONCE
Qty: 0 | Refills: 0 | Status: COMPLETED | OUTPATIENT
Start: 2017-04-09 | End: 2017-04-09

## 2017-04-09 RX ADMIN — MEROPENEM 200 MILLIGRAM(S): 1 INJECTION INTRAVENOUS at 13:05

## 2017-04-09 RX ADMIN — Medication 27.5 MILLIGRAM(S): at 12:15

## 2017-04-09 RX ADMIN — MEROPENEM 200 MILLIGRAM(S): 1 INJECTION INTRAVENOUS at 05:47

## 2017-04-09 RX ADMIN — Medication 166.67 MILLIGRAM(S): at 17:54

## 2017-04-09 RX ADMIN — Medication 100 MILLIGRAM(S): at 12:15

## 2017-04-09 RX ADMIN — Medication 166.67 MILLIGRAM(S): at 05:06

## 2017-04-09 RX ADMIN — Medication 27.5 MILLIGRAM(S): at 22:54

## 2017-04-09 RX ADMIN — MEROPENEM 200 MILLIGRAM(S): 1 INJECTION INTRAVENOUS at 22:01

## 2017-04-09 RX ADMIN — Medication 27.5 MILLIGRAM(S): at 06:37

## 2017-04-09 RX ADMIN — ENOXAPARIN SODIUM 40 MILLIGRAM(S): 100 INJECTION SUBCUTANEOUS at 05:40

## 2017-04-09 NOTE — EEG REPORT - NS EEG TEXT BOX
4/8/2017 to 4/9/2017    Indication: Concern for seizures      FINDINGS:  The background was continuous, spontaneously variable and reactive.  During wakefulness, the posteriorly dominant rhythm consisted of 6 Hz activity, with an amplitude to 30 uV, that attenuated to eye opening.  There was continuous irregular generalized delta and theta activity, more prominent over the right with overriding faster frequency and high amplitude (breach effect).    Sleep Background:  Drowsiness was characterized by fragmentation, attenuation, and slowing of the background activity.    Stage II sleep transients were not recorded.    Epileptiform Activity:   No epileptiform discharges were present.    Events:  No clinical events were recorded.  No seizures were recorded.    Activation Procedures:   -Hyperventilation was not performed.    -Photic stimulation was not performed.    Artifacts:  Intermittent myogenic and movement artifacts were noted.    ECG:  The heart rate on single channel ECG was predominantly between 70-80 BPM.    Compressed Spectral Array Digital Analysis    FINDINGS:  Compressed Spectral Array (CSA) data was reviewed separately and correlated with the electroencephalographic findings detailed above.  CSA showed a variable spectral pattern.  Areas of increased power in particular were reviewed in detail, and compared with the raw EEG data.  Areas of abrupt increases in spectral power were reviewed to exclude seizures, and were determined to be artifactual in nature.    The relative ratio of the power of delta range frequencies and faster frequencies remained stable over the course of the study.  There was  definitive increase in the relative power in the delta frequency spectrum apparent in the right hemisphere versus the left hemisphere.      Compressed Spectral Array (Digital Analysis) Summary/ Impression:  There was  definitive increase in the relative power in the delta frequency spectrum apparent in the right hemisphere .  Intermittent areas of increased power reviewed, without definite epileptiform activity associated on CSA.      EEG Classification / Summary:  Abnormal EEG study   -	moderate generalized slowing, more prominent over the right  -	breach effect over right  -	  Clinical Impression:  Findings indicate moderate diffuse or multifocal cerebral dysfunction, worse on the right. Breach effect indicate of local skull defect. There were no seizures recorded.      PRELIM REPORT BY FELLOW 4/8/2017 to 4/9/2017    Indication: Concern for seizures      FINDINGS:  The background was continuous, spontaneously variable and reactive.  During wakefulness, the posteriorly dominant rhythm consisted of 6 Hz activity, with an amplitude to 30 uV, that attenuated to eye opening.  There was continuous irregular generalized delta and theta activity, more prominent over the right with overriding faster frequency and high amplitude (breach effect).    Sleep Background:  Drowsiness was characterized by fragmentation, attenuation, and slowing of the background activity.    Stage II sleep transients were not recorded.    Epileptiform Activity:   No epileptiform discharges were present.    Events:  No clinical events were recorded.  No seizures were recorded.    Activation Procedures:   -Hyperventilation was not performed.    -Photic stimulation was not performed.    Artifacts:  Intermittent myogenic and movement artifacts were noted.    ECG:  The heart rate on single channel ECG was predominantly between 70-80 BPM.    Compressed Spectral Array Digital Analysis    FINDINGS:  Compressed Spectral Array (CSA) data was reviewed separately and correlated with the electroencephalographic findings detailed above.  CSA showed a variable spectral pattern.  Areas of increased power in particular were reviewed in detail, and compared with the raw EEG data.  Areas of abrupt increases in spectral power were reviewed to exclude seizures, and were determined to be artifactual in nature.    The relative ratio of the power of delta range frequencies and faster frequencies remained stable over the course of the study.  There was  definitive increase in the relative power in the delta frequency spectrum apparent in the right hemisphere versus the left hemisphere.      Compressed Spectral Array (Digital Analysis) Summary/ Impression:  There was  definitive increase in the relative power in the delta frequency spectrum apparent in the right hemisphere .  Intermittent areas of increased power reviewed, without definite epileptiform activity associated on CSA.      EEG Classification / Summary:  Abnormal EEG study   -	moderate generalized slowing, more prominent over the right  -	breach effect over right    Clinical Impression:  Findings indicate moderate diffuse or multifocal cerebral dysfunction, worse on the right. Breach effect indicate of local skull defect. There were no seizures recorded.

## 2017-04-10 LAB
ANION GAP SERPL CALC-SCNC: 12 MMOL/L — SIGNIFICANT CHANGE UP (ref 5–17)
BUN SERPL-MCNC: 11 MG/DL — SIGNIFICANT CHANGE UP (ref 7–23)
CALCIUM SERPL-MCNC: 9.7 MG/DL — SIGNIFICANT CHANGE UP (ref 8.4–10.5)
CHLORIDE SERPL-SCNC: 103 MMOL/L — SIGNIFICANT CHANGE UP (ref 96–108)
CO2 SERPL-SCNC: 25 MMOL/L — SIGNIFICANT CHANGE UP (ref 22–31)
CREAT SERPL-MCNC: 0.53 MG/DL — SIGNIFICANT CHANGE UP (ref 0.5–1.3)
GLUCOSE SERPL-MCNC: 103 MG/DL — HIGH (ref 70–99)
HCT VFR BLD CALC: 41 % — SIGNIFICANT CHANGE UP (ref 39–50)
HGB BLD-MCNC: 13.5 G/DL — SIGNIFICANT CHANGE UP (ref 13–17)
MCHC RBC-ENTMCNC: 30.6 PG — SIGNIFICANT CHANGE UP (ref 27–34)
MCHC RBC-ENTMCNC: 32.9 GM/DL — SIGNIFICANT CHANGE UP (ref 32–36)
MCV RBC AUTO: 93 FL — SIGNIFICANT CHANGE UP (ref 80–100)
PLATELET # BLD AUTO: 279 K/UL — SIGNIFICANT CHANGE UP (ref 150–400)
POTASSIUM SERPL-MCNC: 4.8 MMOL/L — SIGNIFICANT CHANGE UP (ref 3.5–5.3)
POTASSIUM SERPL-SCNC: 4.8 MMOL/L — SIGNIFICANT CHANGE UP (ref 3.5–5.3)
RBC # BLD: 4.41 M/UL — SIGNIFICANT CHANGE UP (ref 4.2–5.8)
RBC # FLD: 13.9 % — SIGNIFICANT CHANGE UP (ref 10.3–14.5)
SODIUM SERPL-SCNC: 140 MMOL/L — SIGNIFICANT CHANGE UP (ref 135–145)
VALPROATE SERPL-MCNC: 31 UG/ML — LOW (ref 50–100)
VANCOMYCIN TROUGH SERPL-MCNC: 11 UG/ML — SIGNIFICANT CHANGE UP (ref 10–20)
WBC # BLD: 7.16 K/UL — SIGNIFICANT CHANGE UP (ref 3.8–10.5)
WBC # FLD AUTO: 7.16 K/UL — SIGNIFICANT CHANGE UP (ref 3.8–10.5)

## 2017-04-10 PROCEDURE — 99233 SBSQ HOSP IP/OBS HIGH 50: CPT | Mod: GC

## 2017-04-10 PROCEDURE — 99232 SBSQ HOSP IP/OBS MODERATE 35: CPT

## 2017-04-10 PROCEDURE — 95957 EEG DIGITAL ANALYSIS: CPT | Mod: 26

## 2017-04-10 PROCEDURE — 95951: CPT | Mod: 26

## 2017-04-10 RX ORDER — ACETAMINOPHEN 500 MG
650 TABLET ORAL EVERY 6 HOURS
Qty: 0 | Refills: 0 | Status: DISCONTINUED | OUTPATIENT
Start: 2017-04-10 | End: 2017-04-14

## 2017-04-10 RX ORDER — DIVALPROEX SODIUM 500 MG/1
500 TABLET, DELAYED RELEASE ORAL EVERY 12 HOURS
Qty: 0 | Refills: 0 | Status: DISCONTINUED | OUTPATIENT
Start: 2017-04-10 | End: 2017-04-11

## 2017-04-10 RX ADMIN — ENOXAPARIN SODIUM 40 MILLIGRAM(S): 100 INJECTION SUBCUTANEOUS at 05:08

## 2017-04-10 RX ADMIN — Medication 100 MILLIGRAM(S): at 11:22

## 2017-04-10 RX ADMIN — DIVALPROEX SODIUM 500 MILLIGRAM(S): 500 TABLET, DELAYED RELEASE ORAL at 21:27

## 2017-04-10 RX ADMIN — MEROPENEM 200 MILLIGRAM(S): 1 INJECTION INTRAVENOUS at 05:07

## 2017-04-10 RX ADMIN — Medication 166.67 MILLIGRAM(S): at 06:41

## 2017-04-10 RX ADMIN — Medication 27.5 MILLIGRAM(S): at 09:42

## 2017-04-10 NOTE — EEG REPORT - NS EEG TEXT BOX
4/9/2017 to 4/10/2017    Indication: Concern for seizures      FINDINGS:  The background was continuous, spontaneously variable and reactive.  During wakefulness, the posteriorly dominant rhythm consisted of 6 Hz activity, with an amplitude to 30 uV, that attenuated to eye opening.  There was continuous irregular generalized delta and theta activity, more prominent over the right with overriding faster frequency and high amplitude (breach effect).    Sleep Background:  Drowsiness was characterized by fragmentation, attenuation, and slowing of the background activity.    Stage II sleep transients were noted by K-complexes and rudimentary sleep spindles.    Epileptiform Activity:   No epileptiform discharges were present.    Events:  No clinical events were recorded.  No seizures were recorded.    Activation Procedures:   -Hyperventilation was not performed.    -Photic stimulation was not performed.    Artifacts:  Intermittent myogenic and movement artifacts were noted.    ECG:  The heart rate on single channel ECG was predominantly between 70-80 BPM.    Compressed Spectral Array Digital Analysis    FINDINGS:  Compressed Spectral Array (CSA) data was reviewed separately and correlated with the electroencephalographic findings detailed above.  CSA showed a variable spectral pattern.  Areas of increased power in particular were reviewed in detail, and compared with the raw EEG data.  Areas of abrupt increases in spectral power were reviewed to exclude seizures, and were determined to be artifactual in nature.    The relative ratio of the power of delta range frequencies and faster frequencies remained stable over the course of the study.  There was  definitive increase in the relative power in the delta frequency spectrum apparent in the right hemisphere versus the left hemisphere.      Compressed Spectral Array (Digital Analysis) Summary/ Impression:  There was  definitive increase in the relative power in the delta frequency spectrum apparent in the right hemisphere .  Intermittent areas of increased power reviewed, without definite epileptiform activity associated on CSA.      EEG Classification / Summary:  Abnormal EEG study   -	moderate generalized slowing, more prominent over the right  -	breach effect over right    Clinical Impression:  Findings indicate moderate diffuse or multifocal cerebral dysfunction, worse on the right. Breach effect indicate of local skull defect. There were no seizures recorded.

## 2017-04-11 LAB
PHENYTOIN FREE SERPL-MCNC: 1.9 UG/ML — LOW (ref 10–20)
PHENYTOIN FREE SERPL-MCNC: 2.7 MG/L — HIGH (ref 1–2)
PHENYTOIN FREE SERPL-MCNC: 3 MG/L — HIGH (ref 1–2)
PHENYTOIN FREE SERPL-MCNC: 4.4 UG/ML — LOW (ref 10–20)
VALPROATE FREE SERPL-MCNC: 1.9 MG/L — LOW (ref 4.8–17.3)
VALPROATE FREE SERPL-MCNC: 2.2 MG/L — LOW (ref 4.8–17.3)
VALPROATE SERPL-MCNC: 3 UG/ML — LOW (ref 50–100)

## 2017-04-11 PROCEDURE — 95951: CPT | Mod: 26,52

## 2017-04-11 PROCEDURE — 95957 EEG DIGITAL ANALYSIS: CPT | Mod: 26

## 2017-04-11 PROCEDURE — 99233 SBSQ HOSP IP/OBS HIGH 50: CPT | Mod: GC

## 2017-04-11 RX ORDER — VALPROIC ACID (AS SODIUM SALT) 250 MG/5ML
500 SOLUTION, ORAL ORAL EVERY 12 HOURS
Qty: 0 | Refills: 0 | Status: DISCONTINUED | OUTPATIENT
Start: 2017-04-11 | End: 2017-04-13

## 2017-04-11 RX ORDER — VALPROIC ACID (AS SODIUM SALT) 250 MG/5ML
800 SOLUTION, ORAL ORAL ONCE
Qty: 0 | Refills: 0 | Status: COMPLETED | OUTPATIENT
Start: 2017-04-11 | End: 2017-04-11

## 2017-04-11 RX ADMIN — DIVALPROEX SODIUM 500 MILLIGRAM(S): 500 TABLET, DELAYED RELEASE ORAL at 09:19

## 2017-04-11 RX ADMIN — Medication 500 MILLIGRAM(S): at 23:25

## 2017-04-11 RX ADMIN — ENOXAPARIN SODIUM 40 MILLIGRAM(S): 100 INJECTION SUBCUTANEOUS at 05:48

## 2017-04-11 RX ADMIN — Medication 100 MILLIGRAM(S): at 13:05

## 2017-04-11 RX ADMIN — Medication 29 MILLIGRAM(S): at 16:21

## 2017-04-11 NOTE — DIETITIAN INITIAL EVALUATION ADULT. - OTHER INFO
Pt. seen for length of stay and dysphagia diet.  Pt. is not reliable historian.  States his usual weight is mid 150's and denies any recent weight loss.  Per speech eval, pt had MBS 6/16 and it was recommended he be on a Dysphagia 3 diet with nectar thick liquids.  Pt answered "yes" when asked if he was following this PTA .  Answers very slow at times.  Observed at breakfast this AM.  Eating egg puree and hot cereal without any choking.  Fed himself with set up.  Per staff, Pt. seen for length of stay and dysphagia diet.  Pt. is not reliable historian.  States his usual weight is mid 150's  and denies any recent weight loss.  Per speech eval, pt had MBS 6/16 and it was rechagia 3 diet with nectar thick liquids.  Pt answered "yes" when asked if he was follow ing this PTA .  Answers very slow at times.  Observed at  breakfast this AM.  Eating egg puree and hot cereal with  out any choking.  Fed himself with set up.  Per staff, pt did not eat any lunch yesterday and ate small amount of breakfast.  No intake documented.

## 2017-04-11 NOTE — DIETITIAN INITIAL EVALUATION ADULT. - ENERGY NEEDS
Stated Height 6'  actual admission weight 153#  pt reports #  current BMI 20.7  # +/- 10%  Pt with history of brain tumor treated with surgery, chemo.  Skin pressure ulcers on admission State 2 both buttocks and sacrum

## 2017-04-11 NOTE — EEG REPORT - NS EEG TEXT BOX
4/10/2017 to 4/11/2017    Indication: Concern for seizures      FINDINGS:  The background was continuous, spontaneously variable and reactive.  During wakefulness, the posteriorly dominant rhythm consisted of 6 Hz activity, with an amplitude to 30 uV, that attenuated to eye opening.  There was continuous irregular generalized delta and theta activity, more prominent over the right with overriding faster frequency and high amplitude (breach effect).    Sleep Background:  Drowsiness was characterized by fragmentation, attenuation, and slowing of the background activity.    Stage II sleep transients were noted by K-complexes and rudimentary sleep spindles.    Epileptiform Activity:   No epileptiform discharges were present.    Events:  No clinical events were recorded.  No seizures were recorded.    Activation Procedures:   -Hyperventilation was not performed.    -Photic stimulation was not performed.    Artifacts:  Intermittent myogenic and movement artifacts were noted.    ECG:  The heart rate on single channel ECG was predominantly between 70-80 BPM.    Compressed Spectral Array Digital Analysis    FINDINGS:  Compressed Spectral Array (CSA) data was reviewed separately and correlated with the electroencephalographic findings detailed above.  CSA showed a variable spectral pattern.  Areas of increased power in particular were reviewed in detail, and compared with the raw EEG data.  Areas of abrupt increases in spectral power were reviewed to exclude seizures, and were determined to be artifactual in nature.    The relative ratio of the power of delta range frequencies and faster frequencies remained stable over the course of the study.  There was  definitive increase in the relative power in the delta frequency spectrum apparent in the right hemisphere versus the left hemisphere.      Compressed Spectral Array (Digital Analysis) Summary/ Impression:  There was  definitive increase in the relative power in the delta frequency spectrum apparent in the right hemisphere .  Intermittent areas of increased power reviewed, without definite epileptiform activity associated on CSA.      EEG Classification / Summary:  Abnormal EEG study   -	continuous polymorphic slowing, focal, left hemisphere, greatest left temporal.   -	moderate generalized background slowing, more prominent over the right  -	breach effect over right    Clinical Impression:  Findings indicate moderate diffuse or multifocal cerebral dysfunction, worse on the right, and a structural abnormality on the right. Breach effect indicate of local skull defect. There were no seizures recorded.

## 2017-04-12 LAB
PHENYTOIN FREE SERPL-MCNC: 1.5 MG/L — SIGNIFICANT CHANGE UP (ref 1–2)
VALPROATE SERPL-MCNC: 13 UG/ML — LOW (ref 50–100)

## 2017-04-12 PROCEDURE — 99232 SBSQ HOSP IP/OBS MODERATE 35: CPT | Mod: GC

## 2017-04-12 RX ADMIN — Medication 500 MILLIGRAM(S): at 05:02

## 2017-04-12 RX ADMIN — ENOXAPARIN SODIUM 40 MILLIGRAM(S): 100 INJECTION SUBCUTANEOUS at 05:02

## 2017-04-12 RX ADMIN — Medication 500 MILLIGRAM(S): at 18:57

## 2017-04-12 RX ADMIN — Medication 100 MILLIGRAM(S): at 12:20

## 2017-04-13 LAB
CULTURE RESULTS: SIGNIFICANT CHANGE UP
CULTURE RESULTS: SIGNIFICANT CHANGE UP
SPECIMEN SOURCE: SIGNIFICANT CHANGE UP
SPECIMEN SOURCE: SIGNIFICANT CHANGE UP

## 2017-04-13 PROCEDURE — 99232 SBSQ HOSP IP/OBS MODERATE 35: CPT | Mod: GC

## 2017-04-13 RX ORDER — VALPROIC ACID (AS SODIUM SALT) 250 MG/5ML
800 SOLUTION, ORAL ORAL ONCE
Qty: 0 | Refills: 0 | Status: COMPLETED | OUTPATIENT
Start: 2017-04-13 | End: 2017-04-13

## 2017-04-13 RX ORDER — AMANTADINE HCL 100 MG
1 CAPSULE ORAL
Qty: 0 | Refills: 0 | COMMUNITY

## 2017-04-13 RX ORDER — ACETAMINOPHEN 500 MG
2 TABLET ORAL
Qty: 0 | Refills: 0 | COMMUNITY
Start: 2017-04-13

## 2017-04-13 RX ORDER — AMANTADINE HCL 100 MG
1 CAPSULE ORAL
Qty: 0 | Refills: 0 | COMMUNITY
Start: 2017-04-13

## 2017-04-13 RX ORDER — VALPROIC ACID (AS SODIUM SALT) 250 MG/5ML
1 SOLUTION, ORAL ORAL
Qty: 0 | Refills: 0 | COMMUNITY

## 2017-04-13 RX ORDER — DIVALPROEX SODIUM 500 MG/1
500 TABLET, DELAYED RELEASE ORAL EVERY 12 HOURS
Qty: 0 | Refills: 0 | Status: DISCONTINUED | OUTPATIENT
Start: 2017-04-13 | End: 2017-04-14

## 2017-04-13 RX ORDER — DIVALPROEX SODIUM 500 MG/1
1 TABLET, DELAYED RELEASE ORAL
Qty: 0 | Refills: 0 | COMMUNITY
Start: 2017-04-13

## 2017-04-13 RX ADMIN — Medication 29 MILLIGRAM(S): at 18:00

## 2017-04-13 RX ADMIN — Medication 100 MILLIGRAM(S): at 12:47

## 2017-04-13 RX ADMIN — Medication 500 MILLIGRAM(S): at 06:27

## 2017-04-13 RX ADMIN — DIVALPROEX SODIUM 500 MILLIGRAM(S): 500 TABLET, DELAYED RELEASE ORAL at 18:54

## 2017-04-13 RX ADMIN — ENOXAPARIN SODIUM 40 MILLIGRAM(S): 100 INJECTION SUBCUTANEOUS at 06:27

## 2017-04-14 ENCOUNTER — APPOINTMENT (OUTPATIENT)
Dept: MRI IMAGING | Facility: CLINIC | Age: 62
End: 2017-04-14

## 2017-04-14 VITALS
RESPIRATION RATE: 18 BRPM | SYSTOLIC BLOOD PRESSURE: 121 MMHG | TEMPERATURE: 98 F | OXYGEN SATURATION: 99 % | HEART RATE: 77 BPM | DIASTOLIC BLOOD PRESSURE: 73 MMHG

## 2017-04-14 LAB — VALPROATE SERPL-MCNC: 2 UG/ML — LOW (ref 50–100)

## 2017-04-14 PROCEDURE — 92611 MOTION FLUOROSCOPY/SWALLOW: CPT

## 2017-04-14 PROCEDURE — 95951: CPT

## 2017-04-14 PROCEDURE — 99232 SBSQ HOSP IP/OBS MODERATE 35: CPT | Mod: GC

## 2017-04-14 PROCEDURE — 97535 SELF CARE MNGMENT TRAINING: CPT

## 2017-04-14 PROCEDURE — 95957 EEG DIGITAL ANALYSIS: CPT

## 2017-04-14 PROCEDURE — 85730 THROMBOPLASTIN TIME PARTIAL: CPT

## 2017-04-14 PROCEDURE — 95819 EEG AWAKE AND ASLEEP: CPT

## 2017-04-14 PROCEDURE — 70553 MRI BRAIN STEM W/O & W/DYE: CPT

## 2017-04-14 PROCEDURE — 97165 OT EVAL LOW COMPLEX 30 MIN: CPT

## 2017-04-14 PROCEDURE — 80185 ASSAY OF PHENYTOIN TOTAL: CPT

## 2017-04-14 PROCEDURE — 82607 VITAMIN B-12: CPT

## 2017-04-14 PROCEDURE — 97162 PT EVAL MOD COMPLEX 30 MIN: CPT

## 2017-04-14 PROCEDURE — 87040 BLOOD CULTURE FOR BACTERIA: CPT

## 2017-04-14 PROCEDURE — 80186 ASSAY OF PHENYTOIN FREE: CPT

## 2017-04-14 PROCEDURE — A9585: CPT

## 2017-04-14 PROCEDURE — 74230 X-RAY XM SWLNG FUNCJ C+: CPT

## 2017-04-14 PROCEDURE — 80048 BASIC METABOLIC PNL TOTAL CA: CPT

## 2017-04-14 PROCEDURE — 80165 DIPROPYLACETIC ACID FREE: CPT

## 2017-04-14 PROCEDURE — 80053 COMPREHEN METABOLIC PANEL: CPT

## 2017-04-14 PROCEDURE — 70450 CT HEAD/BRAIN W/O DYE: CPT

## 2017-04-14 PROCEDURE — 80202 ASSAY OF VANCOMYCIN: CPT

## 2017-04-14 PROCEDURE — G0515: CPT

## 2017-04-14 PROCEDURE — 80164 ASSAY DIPROPYLACETIC ACD TOT: CPT

## 2017-04-14 PROCEDURE — 96374 THER/PROPH/DIAG INJ IV PUSH: CPT

## 2017-04-14 PROCEDURE — 99285 EMERGENCY DEPT VISIT HI MDM: CPT | Mod: 25

## 2017-04-14 PROCEDURE — 92610 EVALUATE SWALLOWING FUNCTION: CPT

## 2017-04-14 PROCEDURE — 82746 ASSAY OF FOLIC ACID SERUM: CPT

## 2017-04-14 PROCEDURE — 93005 ELECTROCARDIOGRAM TRACING: CPT

## 2017-04-14 PROCEDURE — 85027 COMPLETE CBC AUTOMATED: CPT

## 2017-04-14 PROCEDURE — 85610 PROTHROMBIN TIME: CPT

## 2017-04-14 RX ADMIN — ENOXAPARIN SODIUM 40 MILLIGRAM(S): 100 INJECTION SUBCUTANEOUS at 05:28

## 2017-04-14 RX ADMIN — Medication 100 MILLIGRAM(S): at 10:36

## 2017-04-14 RX ADMIN — DIVALPROEX SODIUM 500 MILLIGRAM(S): 500 TABLET, DELAYED RELEASE ORAL at 05:28

## 2017-04-19 ENCOUNTER — APPOINTMENT (OUTPATIENT)
Dept: NEUROLOGY | Facility: CLINIC | Age: 62
End: 2017-04-19

## 2017-05-02 ENCOUNTER — APPOINTMENT (OUTPATIENT)
Dept: HEMATOLOGY ONCOLOGY | Facility: CLINIC | Age: 62
End: 2017-05-02

## 2017-05-02 ENCOUNTER — APPOINTMENT (OUTPATIENT)
Dept: NEUROLOGY | Facility: CLINIC | Age: 62
End: 2017-05-02

## 2017-05-02 ENCOUNTER — OUTPATIENT (OUTPATIENT)
Dept: OUTPATIENT SERVICES | Facility: HOSPITAL | Age: 62
LOS: 1 days | Discharge: HOME | End: 2017-05-02

## 2017-05-02 VITALS
HEIGHT: 72 IN | WEIGHT: 147 LBS | DIASTOLIC BLOOD PRESSURE: 80 MMHG | SYSTOLIC BLOOD PRESSURE: 118 MMHG | HEART RATE: 76 BPM | BODY MASS INDEX: 19.91 KG/M2 | RESPIRATION RATE: 16 BRPM

## 2017-05-02 DIAGNOSIS — G40.909 EPILEPSY, UNSPECIFIED, NOT INTRACTABLE, W/OUT STATUS EPILEPTICUS: ICD-10-CM

## 2017-05-02 DIAGNOSIS — D64.9 ANEMIA, UNSPECIFIED: ICD-10-CM

## 2017-06-30 ENCOUNTER — EMERGENCY (EMERGENCY)
Facility: HOSPITAL | Age: 62
LOS: 1 days | End: 2017-06-30
Payer: COMMERCIAL

## 2017-06-30 PROCEDURE — 71010: CPT | Mod: 26

## 2017-06-30 PROCEDURE — 70450 CT HEAD/BRAIN W/O DYE: CPT | Mod: 26

## 2017-06-30 PROCEDURE — 99285 EMERGENCY DEPT VISIT HI MDM: CPT

## 2017-07-14 ENCOUNTER — OUTPATIENT (OUTPATIENT)
Dept: OUTPATIENT SERVICES | Facility: HOSPITAL | Age: 62
LOS: 1 days | End: 2017-07-14
Payer: COMMERCIAL

## 2017-07-14 ENCOUNTER — APPOINTMENT (OUTPATIENT)
Dept: MRI IMAGING | Facility: IMAGING CENTER | Age: 62
End: 2017-07-14

## 2017-07-14 DIAGNOSIS — Z00.8 ENCOUNTER FOR OTHER GENERAL EXAMINATION: ICD-10-CM

## 2017-07-14 PROCEDURE — A9585: CPT

## 2017-07-14 PROCEDURE — 70553 MRI BRAIN STEM W/O & W/DYE: CPT

## 2017-07-14 PROCEDURE — 82565 ASSAY OF CREATININE: CPT

## 2017-07-18 ENCOUNTER — APPOINTMENT (OUTPATIENT)
Dept: NEUROLOGY | Facility: CLINIC | Age: 62
End: 2017-07-18

## 2017-07-18 ENCOUNTER — APPOINTMENT (OUTPATIENT)
Dept: HEMATOLOGY ONCOLOGY | Facility: CLINIC | Age: 62
End: 2017-07-18

## 2017-07-18 ENCOUNTER — OUTPATIENT (OUTPATIENT)
Dept: OUTPATIENT SERVICES | Facility: HOSPITAL | Age: 62
LOS: 1 days | Discharge: HOME | End: 2017-07-18

## 2017-07-18 VITALS
DIASTOLIC BLOOD PRESSURE: 74 MMHG | RESPIRATION RATE: 16 BRPM | OXYGEN SATURATION: 97 % | HEART RATE: 78 BPM | SYSTOLIC BLOOD PRESSURE: 110 MMHG

## 2017-07-18 DIAGNOSIS — D64.9 ANEMIA, UNSPECIFIED: ICD-10-CM

## 2017-07-18 DIAGNOSIS — G93.6 CEREBRAL EDEMA: ICD-10-CM

## 2017-07-18 DIAGNOSIS — Z51.5 ENCOUNTER FOR ANTINEOPLASTIC CHEMOTHERAPY: ICD-10-CM

## 2017-07-18 DIAGNOSIS — Z51.11 ENCOUNTER FOR ANTINEOPLASTIC CHEMOTHERAPY: ICD-10-CM

## 2017-07-18 RX ORDER — VALPROIC ACID 250 MG/1
250 CAPSULE, LIQUID FILLED ORAL
Qty: 60 | Refills: 0 | Status: ACTIVE | COMMUNITY
Start: 2017-07-01

## 2017-07-18 RX ORDER — AMANTADINE HYDROCHLORIDE 100 1/1
100 TABLET ORAL
Qty: 180 | Refills: 0 | Status: DISCONTINUED | COMMUNITY
Start: 2017-06-06

## 2017-07-18 RX ORDER — VALPROIC ACID 250 MG/5ML
250 SOLUTION ORAL
Refills: 0 | Status: DISCONTINUED | COMMUNITY
Start: 2017-05-02 | End: 2017-07-18

## 2017-07-24 PROBLEM — Z51.11 ON PALLIATIVE ANTINEOPLASTIC CHEMOTHERAPY: Status: ACTIVE | Noted: 2017-05-09

## 2017-07-26 DIAGNOSIS — R45.1 RESTLESSNESS AND AGITATION: ICD-10-CM

## 2017-07-26 RX ORDER — OLANZAPINE 5 MG/1
5 TABLET, FILM COATED ORAL
Qty: 60 | Refills: 3 | Status: ACTIVE | COMMUNITY
Start: 2017-07-26 | End: 1900-01-01

## 2017-09-01 ENCOUNTER — APPOINTMENT (OUTPATIENT)
Dept: ULTRASOUND IMAGING | Facility: CLINIC | Age: 62
End: 2017-09-01
Payer: COMMERCIAL

## 2017-09-01 ENCOUNTER — OUTPATIENT (OUTPATIENT)
Dept: OUTPATIENT SERVICES | Facility: HOSPITAL | Age: 62
LOS: 1 days | End: 2017-09-01
Payer: COMMERCIAL

## 2017-09-01 DIAGNOSIS — Z00.8 ENCOUNTER FOR OTHER GENERAL EXAMINATION: ICD-10-CM

## 2017-09-01 PROCEDURE — 93970 EXTREMITY STUDY: CPT | Mod: 26

## 2017-09-01 PROCEDURE — 93970 EXTREMITY STUDY: CPT

## 2017-09-05 ENCOUNTER — INPATIENT (INPATIENT)
Facility: HOSPITAL | Age: 62
LOS: 9 days | Discharge: HOME | DRG: 54 | End: 2017-09-15
Attending: PSYCHIATRY & NEUROLOGY | Admitting: PSYCHIATRY & NEUROLOGY
Payer: COMMERCIAL

## 2017-09-05 VITALS
DIASTOLIC BLOOD PRESSURE: 75 MMHG | HEART RATE: 96 BPM | WEIGHT: 164.91 LBS | SYSTOLIC BLOOD PRESSURE: 111 MMHG | OXYGEN SATURATION: 98 % | RESPIRATION RATE: 20 BRPM | TEMPERATURE: 98 F

## 2017-09-05 DIAGNOSIS — G40.909 EPILEPSY, UNSPECIFIED, NOT INTRACTABLE, WITHOUT STATUS EPILEPTICUS: ICD-10-CM

## 2017-09-05 DIAGNOSIS — C71.9 MALIGNANT NEOPLASM OF BRAIN, UNSPECIFIED: ICD-10-CM

## 2017-09-05 LAB
ALBUMIN SERPL ELPH-MCNC: 3.7 G/DL — SIGNIFICANT CHANGE UP (ref 3.3–5)
ALP SERPL-CCNC: 57 U/L — SIGNIFICANT CHANGE UP (ref 40–120)
ALT FLD-CCNC: 32 U/L RC — SIGNIFICANT CHANGE UP (ref 10–45)
ANION GAP SERPL CALC-SCNC: 14 MMOL/L — SIGNIFICANT CHANGE UP (ref 5–17)
AST SERPL-CCNC: 27 U/L — SIGNIFICANT CHANGE UP (ref 10–40)
BASOPHILS # BLD AUTO: 0 K/UL — SIGNIFICANT CHANGE UP (ref 0–0.2)
BASOPHILS NFR BLD AUTO: 0.1 % — SIGNIFICANT CHANGE UP (ref 0–2)
BILIRUB SERPL-MCNC: 0.3 MG/DL — SIGNIFICANT CHANGE UP (ref 0.2–1.2)
BUN SERPL-MCNC: 27 MG/DL — HIGH (ref 7–23)
CALCIUM SERPL-MCNC: 9.4 MG/DL — SIGNIFICANT CHANGE UP (ref 8.4–10.5)
CHLORIDE SERPL-SCNC: 101 MMOL/L — SIGNIFICANT CHANGE UP (ref 96–108)
CO2 SERPL-SCNC: 25 MMOL/L — SIGNIFICANT CHANGE UP (ref 22–31)
CREAT SERPL-MCNC: 0.73 MG/DL — SIGNIFICANT CHANGE UP (ref 0.5–1.3)
EOSINOPHIL # BLD AUTO: 0 K/UL — SIGNIFICANT CHANGE UP (ref 0–0.5)
EOSINOPHIL NFR BLD AUTO: 0.3 % — SIGNIFICANT CHANGE UP (ref 0–6)
GLUCOSE SERPL-MCNC: 95 MG/DL — SIGNIFICANT CHANGE UP (ref 70–99)
HCT VFR BLD CALC: 44.6 % — SIGNIFICANT CHANGE UP (ref 39–50)
HGB BLD-MCNC: 14.7 G/DL — SIGNIFICANT CHANGE UP (ref 13–17)
LYMPHOCYTES # BLD AUTO: 1 K/UL — SIGNIFICANT CHANGE UP (ref 1–3.3)
LYMPHOCYTES # BLD AUTO: 9.2 % — LOW (ref 13–44)
MAGNESIUM SERPL-MCNC: 2.1 MG/DL — SIGNIFICANT CHANGE UP (ref 1.6–2.6)
MCHC RBC-ENTMCNC: 31 PG — SIGNIFICANT CHANGE UP (ref 27–34)
MCHC RBC-ENTMCNC: 33 GM/DL — SIGNIFICANT CHANGE UP (ref 32–36)
MCV RBC AUTO: 93.7 FL — SIGNIFICANT CHANGE UP (ref 80–100)
MONOCYTES # BLD AUTO: 0.7 K/UL — SIGNIFICANT CHANGE UP (ref 0–0.9)
MONOCYTES NFR BLD AUTO: 6.4 % — SIGNIFICANT CHANGE UP (ref 2–14)
NEUTROPHILS # BLD AUTO: 9 K/UL — HIGH (ref 1.8–7.4)
NEUTROPHILS NFR BLD AUTO: 83.9 % — HIGH (ref 43–77)
PHOSPHATE SERPL-MCNC: 3.9 MG/DL — SIGNIFICANT CHANGE UP (ref 2.5–4.5)
PLATELET # BLD AUTO: 218 K/UL — SIGNIFICANT CHANGE UP (ref 150–400)
POTASSIUM SERPL-MCNC: 4.5 MMOL/L — SIGNIFICANT CHANGE UP (ref 3.5–5.3)
POTASSIUM SERPL-SCNC: 4.5 MMOL/L — SIGNIFICANT CHANGE UP (ref 3.5–5.3)
PROT SERPL-MCNC: 6.8 G/DL — SIGNIFICANT CHANGE UP (ref 6–8.3)
RBC # BLD: 4.76 M/UL — SIGNIFICANT CHANGE UP (ref 4.2–5.8)
RBC # FLD: 14.4 % — SIGNIFICANT CHANGE UP (ref 10.3–14.5)
SODIUM SERPL-SCNC: 140 MMOL/L — SIGNIFICANT CHANGE UP (ref 135–145)
VALPROATE SERPL-MCNC: 74 UG/ML — SIGNIFICANT CHANGE UP (ref 50–100)
WBC # BLD: 10.8 K/UL — HIGH (ref 3.8–10.5)
WBC # FLD AUTO: 10.8 K/UL — HIGH (ref 3.8–10.5)

## 2017-09-05 PROCEDURE — 70450 CT HEAD/BRAIN W/O DYE: CPT | Mod: 26

## 2017-09-05 PROCEDURE — 99285 EMERGENCY DEPT VISIT HI MDM: CPT

## 2017-09-05 RX ORDER — ENOXAPARIN SODIUM 100 MG/ML
40 INJECTION SUBCUTANEOUS EVERY 24 HOURS
Qty: 0 | Refills: 0 | Status: DISCONTINUED | OUTPATIENT
Start: 2017-09-05 | End: 2017-09-15

## 2017-09-05 RX ORDER — IBUPROFEN 200 MG
400 TABLET ORAL EVERY 6 HOURS
Qty: 0 | Refills: 0 | Status: DISCONTINUED | OUTPATIENT
Start: 2017-09-05 | End: 2017-09-15

## 2017-09-05 RX ORDER — DEXAMETHASONE 0.5 MG/5ML
4 ELIXIR ORAL DAILY
Qty: 0 | Refills: 0 | Status: DISCONTINUED | OUTPATIENT
Start: 2017-09-05 | End: 2017-09-08

## 2017-09-05 RX ORDER — OLANZAPINE 15 MG/1
1 TABLET, FILM COATED ORAL
Qty: 0 | Refills: 0 | COMMUNITY

## 2017-09-05 RX ORDER — OLANZAPINE 15 MG/1
5 TABLET, FILM COATED ORAL DAILY
Qty: 0 | Refills: 0 | Status: DISCONTINUED | OUTPATIENT
Start: 2017-09-05 | End: 2017-09-15

## 2017-09-05 RX ORDER — VALPROIC ACID (AS SODIUM SALT) 250 MG/5ML
750 SOLUTION, ORAL ORAL
Qty: 0 | Refills: 0 | Status: DISCONTINUED | OUTPATIENT
Start: 2017-09-05 | End: 2017-09-05

## 2017-09-05 RX ORDER — VALPROIC ACID (AS SODIUM SALT) 250 MG/5ML
750 SOLUTION, ORAL ORAL
Qty: 0 | Refills: 0 | COMMUNITY

## 2017-09-05 RX ORDER — DIVALPROEX SODIUM 500 MG/1
750 TABLET, DELAYED RELEASE ORAL
Qty: 0 | Refills: 0 | Status: DISCONTINUED | OUTPATIENT
Start: 2017-09-05 | End: 2017-09-15

## 2017-09-05 RX ORDER — AMANTADINE HCL 100 MG
100 CAPSULE ORAL DAILY
Qty: 0 | Refills: 0 | Status: DISCONTINUED | OUTPATIENT
Start: 2017-09-05 | End: 2017-09-15

## 2017-09-05 RX ORDER — SENNA PLUS 8.6 MG/1
2 TABLET ORAL AT BEDTIME
Qty: 0 | Refills: 0 | Status: DISCONTINUED | OUTPATIENT
Start: 2017-09-05 | End: 2017-09-15

## 2017-09-05 RX ADMIN — DIVALPROEX SODIUM 750 MILLIGRAM(S): 500 TABLET, DELAYED RELEASE ORAL at 22:39

## 2017-09-05 RX ADMIN — ENOXAPARIN SODIUM 40 MILLIGRAM(S): 100 INJECTION SUBCUTANEOUS at 22:39

## 2017-09-05 NOTE — ED ADULT NURSE NOTE - CHPI ED SYMPTOMS NEG
no loss of consciousness/no change in level of consciousness/no nausea/no confusion/no vomiting/no fever/no dizziness/no blurred vision

## 2017-09-05 NOTE — H&P ADULT - NSHPLABSRESULTS_GEN_ALL_CORE
< from: CT Head No Cont (09.05.17 @ 18:00) >    IMPRESSION:  1.  Status post right frontal craniotomy with mixed density and   calcification in the right frontal lobe likely representing postoperative   changes versus residual neoplasm. No change in bifrontal white matter   lucency. Small left frontal subdural collection unchanged.  2.  No acute intracranial hemorrhage.  3.  Further evaluation with enhanced MRI may be obtained to further   evaluate neoplastic involvement.    < end of copied text >

## 2017-09-05 NOTE — H&P ADULT - NSHPREVIEWOFSYSTEMS_GEN_ALL_CORE
Complains of weakness and leg pain, complains of frequent falls  Denies headache, seizures  Complains of LE swelling  +increased impulsive behavior

## 2017-09-05 NOTE — ED PROVIDER NOTE - MEDICAL DECISION MAKING DETAILS
Kamryn Lane M.D: 62yoM w/ brain tumor presenting with frequent falls and LE weakness. concern for worsening tumor burden intracranially. will check labs, ct head, and likely admission for inability to ambulate.

## 2017-09-05 NOTE — ED PROVIDER NOTE - OBJECTIVE STATEMENT
Kamryn Lane M.D: 62 yoM hx glioma s/p chemo, radiation, gamma knife (last tx 2015) P/W 1 week of increasingly frequent falls- generalized weakness, feels like his legs are giving out. also  with b/l LE edema which is improved today. pt had DVT US outpatient which was negative. also has decreased his decadron dose in the last week. Pt has been using a walker but is still fallinghas hit head, but no loc. No nvdc, no fever chills. Kamryn Lane M.D: 62 yoM hx glioma s/p chemo, radiation, gamma knife (last tx 2015) P/W 1 week of increasingly frequent falls- generalized weakness, feels like his legs are giving out. also  with b/l LE edema which is improved today. pt had DVT US outpatient which was negative. also has decreased his decadron dose in the last week. Pt has been using a walker but is still falling, has hit head, but no loc. No nvdc, no fever chills.

## 2017-09-05 NOTE — ED ADULT NURSE NOTE - OBJECTIVE STATEMENT
62 year old male presented to ED with c/o of frequent falls. Pt has hx of oligoastrocytoma. Pt states he has pain in legs bilaterally, and numbness/tingling. Pt denies blurred vision, no slurred speech. Pt denies CP, SOB, nausea/vomiting, numbness/tingling, fever, chills, cough, dizziness. Pt denies feeling dizzy before falls, wife states he is stumbling over objects. Pt a&ox3, lung sounds clear, heart sounds regular, abdomen soft nontender nondistended to palp. Skin intact. Pt currently resting in bed with MD and wife at bedside. Side rails up for safety. Will continue to monitor and reassess while offering support and reassurance.

## 2017-09-05 NOTE — H&P ADULT - NSHPPHYSICALEXAM_GEN_ALL_CORE
Objective  Vital Signs Last 24 Hrs  T(C): 36.8 (05 Sep 2017 16:05), Max: 36.9 (05 Sep 2017 16:03)  T(F): 98.2 (05 Sep 2017 16:05), Max: 98.5 (05 Sep 2017 16:03)  HR: 82 (05 Sep 2017 16:05) (82 - 96)  BP: 108/72 (05 Sep 2017 16:05) (108/72 - 111/75)  BP(mean): --  RR: 18 (05 Sep 2017 16:05) (18 - 20)  SpO2: 99% (05 Sep 2017 16:05) (98% - 99%)    General Exam   General appearance: No acute distress, well-nourished  MSK: erythematous bilateral lower legs that are warmer to touch than rest of leg with mild tenderness to palpation  Respiratory:    non-labored respirations               Neurological Exam  Mental Status:  alert and oriented to date but thought was at [facility], fluent speech, following commands, repetition and naming intact    Cranial Nerves: PERRL, decreased left eye abduction, visual fields intact no facial droop, no dysarthria    Motor:   Tone:   normal               Strength:  Upper extremity                          Delt       Bicep    Tricep                                                  R         4/5        4+/5        4/5       4+/5                                               L          4-/5        4-/5        4-/5      3/5    Lower extremity                           HF          KE          KF        DF         PF                                               R        3/5        3/5        3/5       4-/5       1/5                                               L         3/5        3/5        3/5      4-/5        1/5    Pronator drift:   left UE and bilateral LE           Dysmetria: in proportion to left UE weakness  Tremor:  none appreciated at rest or in action    Sensation: intact grossly to light touch

## 2017-09-05 NOTE — H&P ADULT - ASSESSMENT
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   Differential includes worsening of neoplastic process in brain vs. spinal cord involvement either leptomeningeal vs. metastasis.

## 2017-09-05 NOTE — H&P ADULT - PROBLEM SELECTOR PLAN 1
Admit to neurology with Q4 hour neuro checks.  Increase decadron to 4mg daily.  MRI brain with/without contrast.   MRI thoracic/lumbar spine with/without contrast.   Will consider increasing olanzapine, possible treatment for pseudobulbar affect. Admit to neurology with Q4 hour neuro checks.  Increase decadron to 4mg daily.  MRI brain with/without contrast.   MRI thoracic/lumbar spine with/without contrast.   Will consider increasing olanzapine, possible treatment for pseudobulbar affect.  Leg dopplers

## 2017-09-05 NOTE — ED PROVIDER NOTE - PHYSICAL EXAMINATION
Kamryn Lane M.D.:   patient awake alert seen lying on stretcher chronically ill appearing NAD .   LUNGS CTAB no wheeze no crackle.   CARD RRR no m/r/g.    Abdomen soft NT ND no rebound no guarding no CVA tenderness.   EXT WWP b/l 1+ pitting edema (improved per wife) no calf tenderness CV 2+DP/PT bilaterally.   neuro A&Ox3 left sided facial droop (chronic per wife) otherwise CN 2-12 intact, gross motor and sensory intact.    skin warm and dry no rash  HEENT: moist mucous membranes, PERRL, EOMI

## 2017-09-05 NOTE — H&P ADULT - HISTORY OF PRESENT ILLNESS
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 Had lower extremity dopplers that were negative for DVT. States that he had alerted Dr. Julia KOCH about the swelling he also had and was instructed to take half of his 4mg dose of decadron. Denies any new weakness of upper extremities.  No recent seizures but wife states his personality has changed recently where he "acts out" more than usual to which the patient laughed. Denies any crying or laughing at inappropriate situations at home. NP had suggested possibly increasing his olanzapine.  At baseline, patient ambulates with walker and is still having instability despite this.

## 2017-09-05 NOTE — ED PROVIDER NOTE - ATTENDING CONTRIBUTION TO CARE
Private Physician Tyshawn, Neuro/onc  62y male pmh glioma, dx 2005, Sp multiple surg, chemo/rt, Gamma Knife 7/2015, No treatment since then. Now comes to ed complains of 1wk hx of leg swelling had reduced decadron dosage and opt dopple neg for  dvt. Pt ambulates with walker now falling using that and being assisted by spouse. Has fallen "every day" past week. No nvdc,mild confusion, has had visual hallucinations, has seen son in house who is currently out of the country, no habits. no fever chills.PE WDWN male looking stated age fatigued/chronically  awake alert speech slow fluent. Gcs15. neck supple, eom intact. CV no rubs, gallops or murmurs, abd soft +bs., neuro moves all extr pain light touch intact  Glenn Frost MD, Facep

## 2017-09-05 NOTE — H&P ADULT - ATTENDING COMMENTS
Exam with slow speech, left facial droop, mild left drift and drift in both legs, other strength full to confrontation. Sensory intact. DTRs1+, toes down. Unable to stand without support. Legs mildly warm and painful to touch, symmetric, without swelling.    Plan as outlined above, will also reach out to his oncologist to see if other management of glioma is warranted

## 2017-09-06 LAB
FOLATE SERPL-MCNC: >20 NG/ML — SIGNIFICANT CHANGE UP (ref 4.8–24.2)
VIT B12 SERPL-MCNC: 610 PG/ML — SIGNIFICANT CHANGE UP (ref 243–894)

## 2017-09-06 PROCEDURE — 72148 MRI LUMBAR SPINE W/O DYE: CPT | Mod: 26

## 2017-09-06 PROCEDURE — 70551 MRI BRAIN STEM W/O DYE: CPT | Mod: 26

## 2017-09-06 PROCEDURE — 99223 1ST HOSP IP/OBS HIGH 75: CPT

## 2017-09-06 RX ADMIN — Medication 4 MILLIGRAM(S): at 06:06

## 2017-09-06 RX ADMIN — DIVALPROEX SODIUM 750 MILLIGRAM(S): 500 TABLET, DELAYED RELEASE ORAL at 18:06

## 2017-09-06 RX ADMIN — ENOXAPARIN SODIUM 40 MILLIGRAM(S): 100 INJECTION SUBCUTANEOUS at 21:14

## 2017-09-06 RX ADMIN — Medication 100 MILLIGRAM(S): at 11:34

## 2017-09-06 RX ADMIN — DIVALPROEX SODIUM 750 MILLIGRAM(S): 500 TABLET, DELAYED RELEASE ORAL at 06:06

## 2017-09-06 RX ADMIN — OLANZAPINE 5 MILLIGRAM(S): 15 TABLET, FILM COATED ORAL at 11:34

## 2017-09-06 NOTE — PROGRESS NOTE ADULT - ASSESSMENT
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   Differential includes worsening of neoplastic process in brain vs. spinal cord involvement either leptomeningeal vs. metastasis.     Plan:  - Q4 hour neuro checks.  - continue decadron to 4mg daily.  - f/u MRI brain with/without contrast.   - f/u MRI thoracic/lumbar spine with/without contrast.   - Will consider increasing olanzapine, possible treatment for pseudobulbar affect.  - r/o DVT LE dopplersAdmit to neurology with Q4 hour neuro checks.

## 2017-09-06 NOTE — PROGRESS NOTE ADULT - ATTENDING COMMENTS
Complaints of difficulty walking and leg pain. Exam with left facial droop and mild left drift. Unable to stand without support. Legs tender, but not swollen. Will rule out DVT, and image brain and spine, also looking for bone mets.

## 2017-09-06 NOTE — PROGRESS NOTE ADULT - SUBJECTIVE AND OBJECTIVE BOX
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   Differential includes worsening of neoplastic process in brain vs. spinal cord involvement either leptomeningeal vs. metastasis  Patient examined at bedside, reporting that he has difficulty keeping balance on his feet.    Objective:  Vital Signs Last 24 Hrs  T(C): 36.8 (06 Sep 2017 13:39), Max: 37.1 (06 Sep 2017 08:21)  T(F): 98.2 (06 Sep 2017 13:39), Max: 98.7 (06 Sep 2017 08:21)  HR: 74 (06 Sep 2017 13:39) (70 - 96)  BP: 102/71 (06 Sep 2017 13:39) (96/63 - 113/75)  BP(mean): --  RR: 18 (06 Sep 2017 13:39) (16 - 20)  SpO2: 98% (06 Sep 2017 13:39) (96% - 99%)    09-05    140  |  101  |  27<H>  ----------------------------<  95  4.5   |  25  |  0.73    Ca    9.4      05 Sep 2017 17:09  Phos  3.9     09-05  Mg     2.1     09-05    TPro  6.8  /  Alb  3.7  /  TBili  0.3  /  DBili  x   /  AST  27  /  ALT  32  /  AlkPhos  57  09-05    MEDICATIONS  (STANDING):  amantadine 100 milliGRAM(s) Oral daily  OLANZapine 5 milliGRAM(s) Oral daily  enoxaparin Injectable 40 milliGRAM(s) SubCutaneous every 24 hours  diVALproex  milliGRAM(s) Oral two times a day  dexamethasone     Tablet 4 milliGRAM(s) Oral daily

## 2017-09-07 ENCOUNTER — RESULT REVIEW (OUTPATIENT)
Age: 62
End: 2017-09-07

## 2017-09-07 LAB
APPEARANCE CSF: CLEAR — SIGNIFICANT CHANGE UP
APPEARANCE CSF: CLEAR — SIGNIFICANT CHANGE UP
APPEARANCE SPUN FLD: COLORLESS — SIGNIFICANT CHANGE UP
APPEARANCE SPUN FLD: COLORLESS — SIGNIFICANT CHANGE UP
COLOR CSF: SIGNIFICANT CHANGE UP
COLOR CSF: SIGNIFICANT CHANGE UP
GLUCOSE CSF-MCNC: 79 MG/DL — HIGH (ref 40–70)
NEUTROPHILS # CSF: SIGNIFICANT CHANGE UP (ref 0–6)
NEUTROPHILS # CSF: SIGNIFICANT CHANGE UP (ref 0–6)
NRBC NFR CSF: <1 — SIGNIFICANT CHANGE UP (ref 0–5)
NRBC NFR CSF: <1 — SIGNIFICANT CHANGE UP (ref 0–5)
PROT CSF-MCNC: 55 MG/DL — HIGH (ref 15–45)
RBC # CSF: 10 /UL — HIGH (ref 0–0)
RBC # CSF: 11 /UL — HIGH (ref 0–0)
TUBE TYPE: SIGNIFICANT CHANGE UP
TUBE TYPE: SIGNIFICANT CHANGE UP

## 2017-09-07 PROCEDURE — 99232 SBSQ HOSP IP/OBS MODERATE 35: CPT

## 2017-09-07 PROCEDURE — 70552 MRI BRAIN STEM W/DYE: CPT | Mod: 26

## 2017-09-07 PROCEDURE — 88108 CYTOPATH CONCENTRATE TECH: CPT | Mod: 26

## 2017-09-07 RX ADMIN — Medication 100 MILLIGRAM(S): at 11:36

## 2017-09-07 RX ADMIN — DIVALPROEX SODIUM 750 MILLIGRAM(S): 500 TABLET, DELAYED RELEASE ORAL at 17:07

## 2017-09-07 RX ADMIN — ENOXAPARIN SODIUM 40 MILLIGRAM(S): 100 INJECTION SUBCUTANEOUS at 21:42

## 2017-09-07 RX ADMIN — Medication 4 MILLIGRAM(S): at 05:37

## 2017-09-07 RX ADMIN — OLANZAPINE 5 MILLIGRAM(S): 15 TABLET, FILM COATED ORAL at 11:36

## 2017-09-07 RX ADMIN — DIVALPROEX SODIUM 750 MILLIGRAM(S): 500 TABLET, DELAYED RELEASE ORAL at 05:37

## 2017-09-07 NOTE — OCCUPATIONAL THERAPY INITIAL EVALUATION ADULT - DIAGNOSIS, OT EVAL
Decreased balance, strength, cognition, endurance impacting ability to perform ADLs and functional mobility

## 2017-09-07 NOTE — PROGRESS NOTE ADULT - ASSESSMENT
61YO M with right frontal anaplastic glioma s/p craniotomy and chemotherapy regimen with increasing falls and lower extremity pain. Differential includes worsening of neoplastic process in brain vs. spinal cord involvement either leptomeningeal vs. metastasis vs hydrocephalus.   MRI Brain w/ contrast shows decreased enhancement and enlarged ventricles.       Plan:  - continue decadron to 4mg daily  - LP with opening pressure (will check basic tests and cyto)   - f/u MRI thoracic/lumbar spine with/without contrast   - LE dopplers pending to r/o DVT    - case d/w attending and Dr. Anderson

## 2017-09-07 NOTE — PROVIDER CONTACT NOTE (OTHER) - BACKGROUND
Patient has a history of Diabetes Mellitus type 2 x approximately 15 years as per spouse. At home patient is on premeal Humalog as per correction scale and Lantus 20 units at bedtime.
Admitted for brain mass. s/p LP 9/7/17

## 2017-09-07 NOTE — OCCUPATIONAL THERAPY INITIAL EVALUATION ADULT - NS ASR FOLLOW COMMAND OT EVAL
able to follow single-step instructions/100% of the time/increased processing time, follows 2 step command s50% of the session

## 2017-09-07 NOTE — PHYSICAL THERAPY INITIAL EVALUATION ADULT - PERTINENT HX OF CURRENT PROBLEM, REHAB EVAL
61y/o with R frontal anaplastic glioma presents with 2wks of increasing falls & at least 1wk of BLE pain. dopplers that were negative for DVT. No recent seizures but wife states his personality has changed recently where he "acts out" more than usual to which the patient laughed. CTH 9/5: Status post R frontal craniotomy with mixed density & calcification in the R frontal lobe likely representing postoperative changes versus residual neoplasm. Small L frontal subdural collection unchanged

## 2017-09-07 NOTE — PROGRESS NOTE ADULT - SUBJECTIVE AND OBJECTIVE BOX
Neurology Progress Note    Brett Jhaveri is a 63YO M with R frontal anaplastic glioma s/p craniotomy and chemotherapy regimen with increasing falls and lower extremity pain recently with CT head grossly stable as compared to MRI from July.    Subjective  Pt seen and examined at bedside. No acute neurological events overnight per nurse. Pt is pleasant, no complaints. Not fully oriented but conversive, answers questions, following commands.       Objective:    Vital Signs Last 24 Hrs  T(C): 36.8 (06 Sep 2017 13:39), Max: 37.1 (06 Sep 2017 08:21)  T(F): 98.2 (06 Sep 2017 13:39), Max: 98.7 (06 Sep 2017 08:21)  HR: 74 (06 Sep 2017 13:39) (70 - 96)  BP: 102/71 (06 Sep 2017 13:39) (96/63 - 113/75)  BP(mean): --  RR: 18 (06 Sep 2017 13:39) (16 - 20)  SpO2: 98% (06 Sep 2017 13:39) (96% - 99%)    PHYSICAL EXAM:   General appearance: No acute distress                 Mental Status: Alert, oriented to person, place, knows year, not month, knows birth month, slow speech, follows simple commands, able to name/repeat  Cranial Nerves: EOMI grossly, PERRL, V1-V3 intact, moderate L facial weakness, tongue midline  Motor: subtle pronator drift LUE, 4/5 LE prox, 4-/5, 4-/5 dorsi/plantar flexion  Sensation: Intact to LT throughout  Brisk reflexes b/l  Coordination: FTN intact b/l, tremor UE b/l          MEDICATIONS  (STANDING):  amantadine 100 milliGRAM(s) Oral daily  OLANZapine 5 milliGRAM(s) Oral daily  enoxaparin Injectable 40 milliGRAM(s) SubCutaneous every 24 hours  diVALproex  milliGRAM(s) Oral two times a day  dexamethasone     Tablet 4 milliGRAM(s) Oral daily    MEDICATIONS  (PRN):  ibuprofen  Tablet 400 milliGRAM(s) Oral every 6 hours PRN Mild pain  bisacodyl 5 milliGRAM(s) Oral daily PRN Constipation  senna 2 Tablet(s) Oral at bedtime PRN Constipation    MRI Brain w/ 9/7:  There has been a right frontal craniotomy. There is enhancing soft tissue in  the right frontal lobe which is smaller when compared to the previous  examination. This is consistent with either residual neoplasm or post  therapy necrosis. The more peripheral area is smaller, now measuring 1.9 cm  in AP diameter x 2.1 cm transversely compared with 1.7 cm in AP diameter x  2.2 mm transversely. The more central lesion is also slightly smaller  measuring 3 cm in AP diameter x 1.9 cm transversely compared with the prior  of 3.4 cm in AP diameter x 2.3 cm transversely. The enhancement has  significantly decreased since 4/7/2017 in the more peripheral anterior  enhancement had measured 2.8 cm AP x 2.8 cm transversely. The more subtle  lesion measured 2.9 cm in AP diameter x 2.5 cm transversely.    There is slightly more enhancement crossing the midline near an area of  cystic change which is also smaller compared to the prior exam.    The ventricles have moderately enlarged since 4/9/2017 but are only mildly  enlarged compared with 7/14/2017. The left frontal parietal extra-axial  collection is significantly smaller and there is left sided dural  enhancement.    Impression: Right frontal postoperative changes with residual enhancement  which has decreased moderately since 7/14/2017. The ventricles are enlarged  which may be due to communicating hydrocephalus.

## 2017-09-07 NOTE — PROVIDER CONTACT NOTE (OTHER) - ACTION/TREATMENT ORDERED:
No interventions at this time. Will make team aware
As per MD, repeat fingerstick in 30 mins and medicated patient with 12 units as per corrective scale.

## 2017-09-07 NOTE — OCCUPATIONAL THERAPY INITIAL EVALUATION ADULT - PLANNED THERAPY INTERVENTIONS, OT EVAL
bed mobility training/transfer training/cognitive, visual perceptual/neuromuscular re-education/fine motor coordination training/ADL retraining/motor coordination training/balance training/strengthening

## 2017-09-07 NOTE — PROCEDURE NOTE - NSPROCDETAILS_GEN_ALL_CORE
area cleaned in sterile fashion/CSF Obtained/location identified, draped/prepped, sterile technique used, needle inserted/introduced

## 2017-09-07 NOTE — OCCUPATIONAL THERAPY INITIAL EVALUATION ADULT - PERTINENT HX OF CURRENT PROBLEM, REHAB EVAL
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 Had lower extremity dopplers that were negative for DVT. States that he had alerted Dr. Julia KOCH about the swelling he also had and was instructed to take half of his 4mg dose of decadron. Denies any new weakness of upper extremities.

## 2017-09-07 NOTE — CONSULT NOTE ADULT - ASSESSMENT
FALLS -- may be related to bifrontal lobe injury or potentially to hydrocephalus  BRAIN TUMOR -- lack of contrast administration hinders evaluation. He is a poor candidate for further therapy, but occasionally oligodendrogliomas can spread systemically (rare for other gliomas to do this). So further workup, including MRI brain with contrast would be useful. He may have radiographically occult CSF disease.   HYDROCEPHALUS -- this is a frequent complication of glioma patients following extensive therapy. i recommend Lumbar Puncture (no increased risk for herniation), followed by analysis of CSF opening pressure, clinical improvement assessment (following LP) and CSF protein/glucose, cell count, and cytology.  DISPO -- happy to see in follow-up as an outpatient.

## 2017-09-07 NOTE — PHYSICAL THERAPY INITIAL EVALUATION ADULT - GAIT DEVIATIONS NOTED, PT EVAL
decreased step length/decreased sheryl/decreased L heel strike and toe clearance, all turns are to the L

## 2017-09-07 NOTE — PROVIDER CONTACT NOTE (OTHER) - RECOMMENDATIONS
Make team aware
Patient to be seen by endocrinologist and started on a premeal insulin regimen in addition to correction scale. Change diet to CCD and ordered Hemoglobin A1c
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
## 2017-09-07 NOTE — PHYSICAL THERAPY INITIAL EVALUATION ADULT - ADDITIONAL COMMENTS
MRI 9/6: does appear to be decreased mass effect on the right lateral ventricle compared with the prior study with better visualization of the right lateral ventricle seen. Study is otherwise relatively unchanged in terms of extent of T2 abnormality though questionable slight increased T2 signal surrounding the upper margin of the left lateral ventricle is appreciated

## 2017-09-07 NOTE — PROVIDER CONTACT NOTE (OTHER) - SITUATION
Ela Comer reported that only 1 out of 2 CSF count tubes from 9/7/17 was able to be processed due to a lab accident

## 2017-09-07 NOTE — PROVIDER CONTACT NOTE (OTHER) - ASSESSMENT
Patient is alert and oriented x 4. Patient is s/p modified [place] swallow.
Alert but confused, resting comfortably in bed. VSS

## 2017-09-08 PROCEDURE — 99231 SBSQ HOSP IP/OBS SF/LOW 25: CPT

## 2017-09-08 PROCEDURE — 93970 EXTREMITY STUDY: CPT | Mod: 26

## 2017-09-08 RX ORDER — DEXAMETHASONE 0.5 MG/5ML
3 ELIXIR ORAL DAILY
Qty: 0 | Refills: 0 | Status: DISCONTINUED | OUTPATIENT
Start: 2017-09-08 | End: 2017-09-11

## 2017-09-08 RX ADMIN — Medication 4 MILLIGRAM(S): at 05:55

## 2017-09-08 RX ADMIN — OLANZAPINE 5 MILLIGRAM(S): 15 TABLET, FILM COATED ORAL at 10:39

## 2017-09-08 RX ADMIN — Medication 3 MILLIGRAM(S): at 17:22

## 2017-09-08 RX ADMIN — DIVALPROEX SODIUM 750 MILLIGRAM(S): 500 TABLET, DELAYED RELEASE ORAL at 17:26

## 2017-09-08 RX ADMIN — DIVALPROEX SODIUM 750 MILLIGRAM(S): 500 TABLET, DELAYED RELEASE ORAL at 05:55

## 2017-09-08 RX ADMIN — ENOXAPARIN SODIUM 40 MILLIGRAM(S): 100 INJECTION SUBCUTANEOUS at 21:46

## 2017-09-08 RX ADMIN — Medication 100 MILLIGRAM(S): at 10:39

## 2017-09-08 NOTE — PROGRESS NOTE ADULT - SUBJECTIVE AND OBJECTIVE BOX
THIS IS A SUBSPECIALIST NOTE BY A NEURO-ONCOLOGIST CONSULTANT.     Patient seen and examined.  Remains with severe Lower extremity weakness.  Cognitively he is improved -- subjectively and objectively.  The CSF results are unimpressive.    MEDICATIONS  (STANDING):  amantadine 100 milliGRAM(s) Oral daily  OLANZapine 5 milliGRAM(s) Oral daily  enoxaparin Injectable 40 milliGRAM(s) SubCutaneous every 24 hours  diVALproex  milliGRAM(s) Oral two times a day  dexamethasone     Tablet 4 milliGRAM(s) Oral daily  diazepam  Injectable 2 milliGRAM(s) IV Push once    MEDICATIONS  (PRN):  ibuprofen  Tablet 400 milliGRAM(s) Oral every 6 hours PRN Mild pain  bisacodyl 5 milliGRAM(s) Oral daily PRN Constipation  senna 2 Tablet(s) Oral at bedtime PRN Constipation    On exam.  more alert.   not oriented to precise date, but knew where he was and that it was September  interactive  makes conversation  (+) grasp bilaterally  (+) L facial and drift  (+) left hemiparesis  (+) bilateral LE weakness Left worse than Right  not hyper-reflexic

## 2017-09-08 NOTE — PROGRESS NOTE ADULT - ASSESSMENT
61YO M with right frontal anaplastic glioma s/p craniotomy and chemotherapy regimen with increasing falls and lower extremity pain. Differential includes worsening of neoplastic process in brain vs. spinal cord involvement either leptomeningeal vs. metastasis vs hydrocephalus.   MRI Brain w/ contrast shows decreased enhancement and enlarged ventricles.       Plan:  - continue decadron to 4mg daily  - s/p LP on 9/7; opening pressure 12  - f/u MRI thoracic/lumbar spine with contrast; non-contrast performed  - LE dopplers pending to r/o DVT 61YO M with right frontal anaplastic glioma s/p craniotomy and chemotherapy regimen with increasing falls and lower extremity pain. Differential includes worsening of neoplastic process in brain vs. spinal cord involvement either leptomeningeal vs. metastasis vs hydrocephalus.   MRI Brain w/ contrast shows decreased enhancement and enlarged ventricles.       Plan:  - Decrease decadron to 3mg every AM x 7 days, then 2mg qAM thereafter, further adjustments as outpatient.  - s/p LP on 9/7; opening pressure 12  - f/u MRI thoracic/lumbar spine with contrast; non-contrast performed  - LE dopplers show no evidence of DVT 63YO M with right frontal anaplastic glioma s/p craniotomy and chemotherapy regimen with increasing falls and lower extremity pain. Differential includes worsening of neoplastic process in brain vs. spinal cord involvement either leptomeningeal vs. metastasis vs hydrocephalus.   MRI Brain w/ contrast shows decreased enhancement and enlarged ventricles.       Plan:  - Decrease decadron to 3mg every AM x 7 days, then 2mg qAM thereafter, further adjustments as outpatient per Dr Anderson.  - s/p LP on 9/7; opening pressure 12, cell count and protein unremarkable  - f/u MRI thoracic/lumbar spine with contrast; non-contrast performed  - LE dopplers show no evidence of DVT  - PT/Rehab

## 2017-09-08 NOTE — PROGRESS NOTE ADULT - ASSESSMENT
CEREBRAL EDEMA - modest and improving. Decrease decadron to 3mg every AM x 7 days, then 2mg qAM thereafter, further adjustments as outpatient.  PROXIMAL MYOPATHY - PT/OT, consider rehab prior to home, lowering decadron can help.  BRAIN CANCER - disease is improving without specific intervention, which supports diagnosis of radiation effects playing major role (rather than tumor progression). Patient has previously declined further therapies.  FALLS -- multifactorial in etiology. Modestly improved following the LP, which is typical for patients with cognitive decline following multiple interventions. These patients can improve following VPS placement.  DISPO -- I would like to see him in my office following discharge home. May call [phone] for appointment.

## 2017-09-08 NOTE — PROGRESS NOTE ADULT - SUBJECTIVE AND OBJECTIVE BOX
Neurology Progress Note    Brett Jhaveri is a 61YO M with R frontal anaplastic glioma s/p craniotomy and chemotherapy regimen with increasing falls and lower extremity pain recently with CT head grossly stable as compared to MRI from July.    Subjective        Objective:    Vital Signs Last 24 Hrs  T(C): 36.8 (08 Sep 2017 07:27), Max: 36.9 (07 Sep 2017 09:10)  T(F): 98.2 (08 Sep 2017 07:27), Max: 98.5 (07 Sep 2017 09:10)  HR: 76 (08 Sep 2017 07:27) (69 - 84)  BP: 112/76 (08 Sep 2017 07:27) (102/63 - 121/74)  BP(mean): --  RR: 18 (08 Sep 2017 07:27) (18 - 18)  SpO2: 96% (08 Sep 2017 07:27) (95% - 100%)    PHYSICAL EXAM:           MEDICATIONS  (STANDING):  amantadine 100 milliGRAM(s) Oral daily  OLANZapine 5 milliGRAM(s) Oral daily  enoxaparin Injectable 40 milliGRAM(s) SubCutaneous every 24 hours  diVALproex  milliGRAM(s) Oral two times a day  dexamethasone     Tablet 4 milliGRAM(s) Oral daily    MEDICATIONS  (PRN):  ibuprofen  Tablet 400 milliGRAM(s) Oral every 6 hours PRN Mild pain  bisacodyl 5 milliGRAM(s) Oral daily PRN Constipation  senna 2 Tablet(s) Oral at bedtime PRN Constipation    MRI Brain w/ 9/7:  There has been a right frontal craniotomy. There is enhancing soft tissue in  the right frontal lobe which is smaller when compared to the previous  examination. This is consistent with either residual neoplasm or post  therapy necrosis. The more peripheral area is smaller, now measuring 1.9 cm  in AP diameter x 2.1 cm transversely compared with 1.7 cm in AP diameter x  2.2 mm transversely. The more central lesion is also slightly smaller  measuring 3 cm in AP diameter x 1.9 cm transversely compared with the prior  of 3.4 cm in AP diameter x 2.3 cm transversely. The enhancement has  significantly decreased since 4/7/2017 in the more peripheral anterior  enhancement had measured 2.8 cm AP x 2.8 cm transversely. The more subtle  lesion measured 2.9 cm in AP diameter x 2.5 cm transversely.    There is slightly more enhancement crossing the midline near an area of  cystic change which is also smaller compared to the prior exam.    The ventricles have moderately enlarged since 4/9/2017 but are only mildly  enlarged compared with 7/14/2017. The left frontal parietal extra-axial  collection is significantly smaller and there is left sided dural  enhancement.    Impression: Right frontal postoperative changes with residual enhancement  which has decreased moderately since 7/14/2017. The ventricles are enlarged  which may be due to communicating hydrocephalus.    MRI T/L-spine:   FINDINGS: Vertebral body height, marrow signal homogeneity, and facet   alignment are maintained throughout the visualized spinal segments. There   is disc space narrowing from L3-L4 through L5-S1. The conus is normal in   size, position, and signal characteristics, ending at T12-L1.    T11-T12: No spinal canal stenosis or neural foraminal narrowing.    T12-L1: No spinal canal stenosis or neural foraminal narrowing.    L1-L2: No spinal canal stenosis or neural foraminal narrowing. Mild   facet/ligamentous hypertrophy.    L2-L3: No spinal canal stenosis or neural foraminal narrowing. Mild   facet/ligamentous hypertrophy.    L3-L4: Minimal disc bulge. Mild bilateral facet/ligament hypertrophy.   Mild thecal sac compression. No significant neural foraminal narrowing.    L5-S1: Mild disc bulge. Mild bilateral facet/ligamentous hypertrophy.   Mild thecal sac compression. Mild right greater than left neural   foraminal narrowing.    L5-S1: Mild disc bulge. Mild bilateral facet/segments hypertrophy. No   mass effect upon the descending S1 nerve roots or thecal sac. Minimal   bilateral neural foraminal narrowing.    IMPRESSION: Mild multilevel degenerative changes as detailed in the body   the report. Neurology Progress Note    Brett Jhaveri is a 63YO M with R frontal anaplastic glioma s/p craniotomy and chemotherapy regimen with increasing falls and lower extremity pain recently with CT head grossly stable as compared to MRI from July.    Subjective    Pt seen and examined at bedside. No acute neurological events overnight per nurse. Pt is pleasant, no complaints. Not fully oriented but conversive, answers questions, following commands    Objective:    Vital Signs Last 24 Hrs  T(C): 36.8 (08 Sep 2017 07:27), Max: 36.9 (07 Sep 2017 09:10)  T(F): 98.2 (08 Sep 2017 07:27), Max: 98.5 (07 Sep 2017 09:10)  HR: 76 (08 Sep 2017 07:27) (69 - 84)  BP: 112/76 (08 Sep 2017 07:27) (102/63 - 121/74)  BP(mean): --  RR: 18 (08 Sep 2017 07:27) (18 - 18)  SpO2: 96% (08 Sep 2017 07:27) (95% - 100%)    PHYSICAL EXAM:     General appearance: No acute distress                 Mental Status: Alert, oriented to person, place, knows year, not month, knows birth month, slow speech, follows simple commands, able to name/repeat  Cranial Nerves: EOMI grossly, PERRL, V1-V3 intact, moderate L facial weakness, tongue midline  Motor: subtle pronator drift LUE, 4/5 LE prox, 4-/5, 4-/5 dorsi/plantar flexion  Sensation: Intact to LT throughout  Brisk reflexes b/l  Coordination: FTN intact b/l, tremor UE b/l      MEDICATIONS  (STANDING):  amantadine 100 milliGRAM(s) Oral daily  OLANZapine 5 milliGRAM(s) Oral daily  enoxaparin Injectable 40 milliGRAM(s) SubCutaneous every 24 hours  diVALproex  milliGRAM(s) Oral two times a day  dexamethasone     Tablet 4 milliGRAM(s) Oral daily    MEDICATIONS  (PRN):  ibuprofen  Tablet 400 milliGRAM(s) Oral every 6 hours PRN Mild pain  bisacodyl 5 milliGRAM(s) Oral daily PRN Constipation  senna 2 Tablet(s) Oral at bedtime PRN Constipation    MRI Brain w/ 9/7:  There has been a right frontal craniotomy. There is enhancing soft tissue in  the right frontal lobe which is smaller when compared to the previous  examination. This is consistent with either residual neoplasm or post  therapy necrosis. The more peripheral area is smaller, now measuring 1.9 cm  in AP diameter x 2.1 cm transversely compared with 1.7 cm in AP diameter x  2.2 mm transversely. The more central lesion is also slightly smaller  measuring 3 cm in AP diameter x 1.9 cm transversely compared with the prior  of 3.4 cm in AP diameter x 2.3 cm transversely. The enhancement has  significantly decreased since 4/7/2017 in the more peripheral anterior  enhancement had measured 2.8 cm AP x 2.8 cm transversely. The more subtle  lesion measured 2.9 cm in AP diameter x 2.5 cm transversely.    There is slightly more enhancement crossing the midline near an area of  cystic change which is also smaller compared to the prior exam.    The ventricles have moderately enlarged since 4/9/2017 but are only mildly  enlarged compared with 7/14/2017. The left frontal parietal extra-axial  collection is significantly smaller and there is left sided dural  enhancement.    Impression: Right frontal postoperative changes with residual enhancement  which has decreased moderately since 7/14/2017. The ventricles are enlarged  which may be due to communicating hydrocephalus.    MRI T/L-spine:   FINDINGS: Vertebral body height, marrow signal homogeneity, and facet   alignment are maintained throughout the visualized spinal segments. There   is disc space narrowing from L3-L4 through L5-S1. The conus is normal in   size, position, and signal characteristics, ending at T12-L1.    T11-T12: No spinal canal stenosis or neural foraminal narrowing.    T12-L1: No spinal canal stenosis or neural foraminal narrowing.    L1-L2: No spinal canal stenosis or neural foraminal narrowing. Mild   facet/ligamentous hypertrophy.    L2-L3: No spinal canal stenosis or neural foraminal narrowing. Mild   facet/ligamentous hypertrophy.    L3-L4: Minimal disc bulge. Mild bilateral facet/ligament hypertrophy.   Mild thecal sac compression. No significant neural foraminal narrowing.    L5-S1: Mild disc bulge. Mild bilateral facet/ligamentous hypertrophy.   Mild thecal sac compression. Mild right greater than left neural   foraminal narrowing.    L5-S1: Mild disc bulge. Mild bilateral facet/segments hypertrophy. No   mass effect upon the descending S1 nerve roots or thecal sac. Minimal   bilateral neural foraminal narrowing.    IMPRESSION: Mild multilevel degenerative changes as detailed in the body   the report.

## 2017-09-09 DIAGNOSIS — R29.898 OTHER SYMPTOMS AND SIGNS INVOLVING THE MUSCULOSKELETAL SYSTEM: ICD-10-CM

## 2017-09-09 PROCEDURE — 99231 SBSQ HOSP IP/OBS SF/LOW 25: CPT

## 2017-09-09 RX ADMIN — DIVALPROEX SODIUM 750 MILLIGRAM(S): 500 TABLET, DELAYED RELEASE ORAL at 17:36

## 2017-09-09 RX ADMIN — Medication 3 MILLIGRAM(S): at 05:30

## 2017-09-09 RX ADMIN — OLANZAPINE 5 MILLIGRAM(S): 15 TABLET, FILM COATED ORAL at 12:49

## 2017-09-09 RX ADMIN — DIVALPROEX SODIUM 750 MILLIGRAM(S): 500 TABLET, DELAYED RELEASE ORAL at 05:30

## 2017-09-09 RX ADMIN — ENOXAPARIN SODIUM 40 MILLIGRAM(S): 100 INJECTION SUBCUTANEOUS at 23:01

## 2017-09-09 RX ADMIN — Medication 100 MILLIGRAM(S): at 12:49

## 2017-09-09 NOTE — PROGRESS NOTE ADULT - ASSESSMENT
61YO M with right frontal anaplastic glioma s/p craniotomy and chemotherapy regimen with increasing falls and lower extremity pain. Differential includes worsening of neoplastic process in brain vs. spinal cord involvement either leptomeningeal vs. metastasis vs hydrocephalus.   MRI Brain w/ contrast shows decreased enhancement and enlarged ventricles. CSF with slightly elevated proteins. pending cyometry, cytopathology.

## 2017-09-09 NOTE — PROGRESS NOTE ADULT - PROBLEM SELECTOR PLAN 1
Stable. Patient to be discharged to rehab with neuro-onc follow-up. Stable. Patient to be discharged to rehab with neuro-onc follow-up. Steroid taper per oncology

## 2017-09-09 NOTE — PROGRESS NOTE ADULT - SUBJECTIVE AND OBJECTIVE BOX
Neurology Follow up    Patient seen and examined. No events overnight, states feels well.  Remains with severe Lower extremity weakness.      MEDICATIONS  (STANDING):  MEDICATIONS  (STANDING):  amantadine 100 milliGRAM(s) Oral daily  OLANZapine 5 milliGRAM(s) Oral daily  enoxaparin Injectable 40 milliGRAM(s) SubCutaneous every 24 hours  diVALproex  milliGRAM(s) Oral two times a day  diazepam  Injectable 2 milliGRAM(s) IV Push once  dexamethasone     Tablet 3 milliGRAM(s) Oral daily    MEDICATIONS  (PRN):  ibuprofen  Tablet 400 milliGRAM(s) Oral every 6 hours PRN Mild pain  bisacodyl 5 milliGRAM(s) Oral daily PRN Constipation  senna 2 Tablet(s) Oral at bedtime PRN Constipation    Vital Signs Last 24 Hrs  T(C): 36.3 (09 Sep 2017 15:48), Max: 37.2 (09 Sep 2017 11:55)  T(F): 97.4 (09 Sep 2017 15:48), Max: 98.9 (09 Sep 2017 11:55)  HR: 70 (09 Sep 2017 15:48) (70 - 94)  BP: 123/75 (09 Sep 2017 15:48) (109/70 - 123/75)  BP(mean): --  RR: 17 (09 Sep 2017 15:48) (17 - 18)  SpO2: 97% (09 Sep 2017 15:48) (96% - 100%)    Mental Status: awake and alert, oriented to person, place, month, year, fluent, follows commands  (+) L facial and drift, no dysarthria, visual fields intact  (+) left hemiparesis  (+) bilateral LE weakness Left worse than Right  Left hand 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

## 2017-09-10 PROCEDURE — 99231 SBSQ HOSP IP/OBS SF/LOW 25: CPT

## 2017-09-10 RX ADMIN — Medication 3 MILLIGRAM(S): at 05:36

## 2017-09-10 RX ADMIN — DIVALPROEX SODIUM 750 MILLIGRAM(S): 500 TABLET, DELAYED RELEASE ORAL at 17:09

## 2017-09-10 RX ADMIN — ENOXAPARIN SODIUM 40 MILLIGRAM(S): 100 INJECTION SUBCUTANEOUS at 23:03

## 2017-09-10 RX ADMIN — Medication 100 MILLIGRAM(S): at 12:31

## 2017-09-10 RX ADMIN — DIVALPROEX SODIUM 750 MILLIGRAM(S): 500 TABLET, DELAYED RELEASE ORAL at 05:36

## 2017-09-10 RX ADMIN — OLANZAPINE 5 MILLIGRAM(S): 15 TABLET, FILM COATED ORAL at 12:31

## 2017-09-10 NOTE — PROGRESS NOTE ADULT - SUBJECTIVE AND OBJECTIVE BOX
Neurology Follow up    Patient seen and examined. No events overnight, states feels well.        MEDICATIONS  (STANDING):  amantadine 100 milliGRAM(s) Oral daily  OLANZapine 5 milliGRAM(s) Oral daily  enoxaparin Injectable 40 milliGRAM(s) SubCutaneous every 24 hours  diVALproex  milliGRAM(s) Oral two times a day  diazepam  Injectable 2 milliGRAM(s) IV Push once  dexamethasone     Tablet 3 milliGRAM(s) Oral daily    MEDICATIONS  (PRN):  ibuprofen  Tablet 400 milliGRAM(s) Oral every 6 hours PRN Mild pain  bisacodyl 5 milliGRAM(s) Oral daily PRN Constipation  senna 2 Tablet(s) Oral at bedtime PRN Constipation    Vital Signs Last 24 Hrs  T(C): 37.1 (10 Sep 2017 12:10), Max: 37.1 (10 Sep 2017 12:10)  T(F): 98.8 (10 Sep 2017 12:10), Max: 98.8 (10 Sep 2017 12:10)  HR: 68 (10 Sep 2017 07:30) (68 - 74)  BP: 107/69 (10 Sep 2017 12:10) (94/57 - 123/75)  BP(mean): --  RR: 20 (10 Sep 2017 12:10) (16 - 20)  SpO2: 99% (10 Sep 2017 12:10) (96% - 99%)    Mental Status: flat, frontal affect, awake and alert, oriented to person, place, month, year, psychomotor slowing  (+) L facial and drift, no dysarthria, visual fields intact  (+) left hemiparesis  (+) bilateral LE weakness Left worse than Right  Left hand 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

## 2017-09-10 NOTE — PROGRESS NOTE ADULT - ASSESSMENT
61YO M with right frontal anaplastic glioma s/p craniotomy and chemotherapy regimen with increasing falls and lower extremity pain.   MRI Brain w/ contrast shows decreased enhancement and enlarged ventricles. CSF with slightly elevated proteins. pending cyometry, cytopathology.

## 2017-09-11 LAB
NON-GYNECOLOGICAL CYTOLOGY STUDY: SIGNIFICANT CHANGE UP
TM INTERPRETATION: SIGNIFICANT CHANGE UP

## 2017-09-11 RX ORDER — DEXAMETHASONE 0.5 MG/5ML
4 ELIXIR ORAL DAILY
Qty: 0 | Refills: 0 | Status: DISCONTINUED | OUTPATIENT
Start: 2017-09-11 | End: 2017-09-15

## 2017-09-11 RX ADMIN — OLANZAPINE 5 MILLIGRAM(S): 15 TABLET, FILM COATED ORAL at 12:42

## 2017-09-11 RX ADMIN — ENOXAPARIN SODIUM 40 MILLIGRAM(S): 100 INJECTION SUBCUTANEOUS at 21:46

## 2017-09-11 RX ADMIN — Medication 100 MILLIGRAM(S): at 12:42

## 2017-09-11 RX ADMIN — Medication 3 MILLIGRAM(S): at 05:15

## 2017-09-11 RX ADMIN — DIVALPROEX SODIUM 750 MILLIGRAM(S): 500 TABLET, DELAYED RELEASE ORAL at 05:15

## 2017-09-11 RX ADMIN — DIVALPROEX SODIUM 750 MILLIGRAM(S): 500 TABLET, DELAYED RELEASE ORAL at 17:41

## 2017-09-11 NOTE — PROGRESS NOTE ADULT - SUBJECTIVE AND OBJECTIVE BOX
Neurology Follow up    Patient seen and examined. No events overnight, states feels well.        MEDICATIONS  (STANDING):  amantadine 100 milliGRAM(s) Oral daily  OLANZapine 5 milliGRAM(s) Oral daily  enoxaparin Injectable 40 milliGRAM(s) SubCutaneous every 24 hours  diVALproex  milliGRAM(s) Oral two times a day  diazepam  Injectable 2 milliGRAM(s) IV Push once  dexamethasone     Tablet 3 milliGRAM(s) Oral daily    MEDICATIONS  (PRN):  ibuprofen  Tablet 400 milliGRAM(s) Oral every 6 hours PRN Mild pain  bisacodyl 5 milliGRAM(s) Oral daily PRN Constipation  senna 2 Tablet(s) Oral at bedtime PRN Constipation    Vital Signs Last 24 Hrs  T(C): 36.6 (11 Sep 2017 07:13), Max: 37.1 (10 Sep 2017 12:10)  T(F): 97.8 (11 Sep 2017 07:13), Max: 98.8 (10 Sep 2017 12:10)  HR: 72 (11 Sep 2017 07:13) (66 - 96)  BP: 112/72 (11 Sep 2017 07:13) (104/72 - 118/75)  BP(mean): --  RR: 18 (11 Sep 2017 07:13) (18 - 20)  SpO2: 99% (11 Sep 2017 07:13) (95% - 99%)    Mental Status: flat, frontal affect, awake and alert, oriented to person, place, month, year, psychomotor slowing  (+) L facial and drift, no dysarthria, visual fields intact  (+) left hemiparesis  (+) bilateral LE weakness Left worse than Right  Left hand 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

## 2017-09-12 RX ORDER — OLANZAPINE 15 MG/1
5 TABLET, FILM COATED ORAL ONCE
Qty: 0 | Refills: 0 | Status: COMPLETED | OUTPATIENT
Start: 2017-09-12 | End: 2017-09-13

## 2017-09-12 RX ADMIN — DIVALPROEX SODIUM 750 MILLIGRAM(S): 500 TABLET, DELAYED RELEASE ORAL at 05:30

## 2017-09-12 RX ADMIN — Medication 4 MILLIGRAM(S): at 05:30

## 2017-09-12 RX ADMIN — ENOXAPARIN SODIUM 40 MILLIGRAM(S): 100 INJECTION SUBCUTANEOUS at 21:49

## 2017-09-12 RX ADMIN — DIVALPROEX SODIUM 750 MILLIGRAM(S): 500 TABLET, DELAYED RELEASE ORAL at 17:59

## 2017-09-12 RX ADMIN — OLANZAPINE 5 MILLIGRAM(S): 15 TABLET, FILM COATED ORAL at 12:13

## 2017-09-12 RX ADMIN — Medication 100 MILLIGRAM(S): at 12:13

## 2017-09-12 NOTE — DIETITIAN INITIAL EVALUATION ADULT. - PROBLEM SELECTOR PLAN 1
Admit to neurology with Q4 hour neuro checks.  Increase decadron to 4mg daily.  MRI brain with/without contrast.   MRI thoracic/lumbar spine with/without contrast.   Will consider increasing olanzapine, possible treatment for pseudobulbar affect.  Leg dopplers

## 2017-09-12 NOTE — DIETITIAN INITIAL EVALUATION ADULT. - ENERGY NEEDS
ht: 72 inches, wt: 164.9 pounds, BMI: 22.4 kg/m2, IBW: 178 pounds (+/- 10%), 93 %IBW  Edema: none noted. Skin: intact.  Other pertinent information: 61 y/o male presented c 2 weeks of falls and LE pain, history of R frontal anaplastic glioma. Per chart plan to be d/c to rehab and followed up with neuro-onc.

## 2017-09-12 NOTE — DIETITIAN INITIAL EVALUATION ADULT. - NS AS NUTRI INTERV MEALS SNACK
1) Continue current diet as ordered. 2) Provide food preferences as feasible. 3) Encourage po intake w/ snacks ordered at meals./Vitamin - modified diet

## 2017-09-12 NOTE — DIETITIAN INITIAL EVALUATION ADULT. - OTHER INFO
Pt seen for length of stay. Pt denies any recent Wt changes, per previous RD note Pt weighed 153lbs in April 2017, current Wt 164.9lbs. ? accuracy of weights vs Wt gain. Pt reports no chewing or swallowing difficulties and denies any GI distress.  Requires some assistance at meals and accepted menu selection assistance from RD.

## 2017-09-12 NOTE — PROGRESS NOTE ADULT - SUBJECTIVE AND OBJECTIVE BOX
Neurology Follow up    Patient seen and examined. No events overnight, states feels well.        MEDICATIONS  (STANDING):  amantadine 100 milliGRAM(s) Oral daily  OLANZapine 5 milliGRAM(s) Oral daily  enoxaparin Injectable 40 milliGRAM(s) SubCutaneous every 24 hours  diVALproex  milliGRAM(s) Oral two times a day  diazepam  Injectable 2 milliGRAM(s) IV Push once  dexamethasone     Tablet 3 milliGRAM(s) Oral daily    MEDICATIONS  (PRN):  ibuprofen  Tablet 400 milliGRAM(s) Oral every 6 hours PRN Mild pain  bisacodyl 5 milliGRAM(s) Oral daily PRN Constipation  senna 2 Tablet(s) Oral at bedtime PRN Constipation    Vital Signs Last 24 Hrs  T(C): 36.9 (12 Sep 2017 09:51), Max: 36.9 (11 Sep 2017 12:43)  T(F): 98.4 (12 Sep 2017 09:51), Max: 98.4 (11 Sep 2017 12:43)  HR: 72 (12 Sep 2017 09:51) (72 - 88)  BP: 101/65 (12 Sep 2017 09:51) (101/65 - 121/77)  BP(mean): --  RR: 18 (12 Sep 2017 09:51) (18 - 18)  SpO2: 96% (12 Sep 2017 09:51) (96% - 99%)      Mental Status: flat, frontal affect, awake and alert, oriented to person, place, month, year, psychomotor slowing  (+) L facial and drift, no dysarthria, visual fields intact  (+) left hemiparesis  (+) bilateral LE weakness Left worse than Right  Left hand 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

## 2017-09-12 NOTE — PROGRESS NOTE ADULT - SUBJECTIVE AND OBJECTIVE BOX
This is a Sub Specialty follow up note.    Mr. Jhaveri is without change. He is awake and alert - oriented and fluent. Slow to respond but appropriate.  He is able to follow simple commands.  VF appear full to confrontation.  Face is slightly weak on the left.  He has a mild left hemiparesis 4/5.  Plan is for subacute rehab.  He should follow up in our office in 1 month.

## 2017-09-13 RX ADMIN — DIVALPROEX SODIUM 750 MILLIGRAM(S): 500 TABLET, DELAYED RELEASE ORAL at 17:46

## 2017-09-13 RX ADMIN — OLANZAPINE 5 MILLIGRAM(S): 15 TABLET, FILM COATED ORAL at 11:05

## 2017-09-13 RX ADMIN — ENOXAPARIN SODIUM 40 MILLIGRAM(S): 100 INJECTION SUBCUTANEOUS at 21:14

## 2017-09-13 RX ADMIN — Medication 4 MILLIGRAM(S): at 05:52

## 2017-09-13 RX ADMIN — Medication 100 MILLIGRAM(S): at 11:05

## 2017-09-13 RX ADMIN — DIVALPROEX SODIUM 750 MILLIGRAM(S): 500 TABLET, DELAYED RELEASE ORAL at 05:52

## 2017-09-13 RX ADMIN — OLANZAPINE 5 MILLIGRAM(S): 15 TABLET, FILM COATED ORAL at 00:49

## 2017-09-13 NOTE — PROGRESS NOTE ADULT - SUBJECTIVE AND OBJECTIVE BOX
Neurology Follow up    Patient seen and examined. No events overnight, states feels well.        MEDICATIONS  (STANDING):  amantadine 100 milliGRAM(s) Oral daily  OLANZapine 5 milliGRAM(s) Oral daily  enoxaparin Injectable 40 milliGRAM(s) SubCutaneous every 24 hours  diVALproex  milliGRAM(s) Oral two times a day  diazepam  Injectable 2 milliGRAM(s) IV Push once  dexamethasone     Tablet 3 milliGRAM(s) Oral daily    MEDICATIONS  (PRN):  ibuprofen  Tablet 400 milliGRAM(s) Oral every 6 hours PRN Mild pain  bisacodyl 5 milliGRAM(s) Oral daily PRN Constipation  senna 2 Tablet(s) Oral at bedtime PRN Constipation    Vital Signs Last 24 Hrs  T(C): 36.9 (13 Sep 2017 05:30), Max: 36.9 (12 Sep 2017 09:51)  T(F): 98.4 (13 Sep 2017 05:30), Max: 98.4 (12 Sep 2017 09:51)  HR: 64 (13 Sep 2017 05:30) (64 - 89)  BP: 97/59 (13 Sep 2017 05:30) (97/59 - 122/76)  BP(mean): --  RR: 18 (13 Sep 2017 05:30) (17 - 18)  SpO2: 98% (13 Sep 2017 05:30) (95% - 98%)      Mental Status: flat, frontal affect, awake and alert, oriented to person, place, month, year, psychomotor slowing  (+) L facial and drift, no dysarthria, visual fields intact  (+) left hemiparesis  (+) bilateral LE weakness Left worse than Right  Left hand 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

## 2017-09-14 RX ADMIN — OLANZAPINE 5 MILLIGRAM(S): 15 TABLET, FILM COATED ORAL at 13:29

## 2017-09-14 RX ADMIN — DIVALPROEX SODIUM 750 MILLIGRAM(S): 500 TABLET, DELAYED RELEASE ORAL at 05:01

## 2017-09-14 RX ADMIN — ENOXAPARIN SODIUM 40 MILLIGRAM(S): 100 INJECTION SUBCUTANEOUS at 22:31

## 2017-09-14 RX ADMIN — Medication 100 MILLIGRAM(S): at 13:28

## 2017-09-14 RX ADMIN — Medication 4 MILLIGRAM(S): at 05:01

## 2017-09-14 RX ADMIN — DIVALPROEX SODIUM 750 MILLIGRAM(S): 500 TABLET, DELAYED RELEASE ORAL at 17:59

## 2017-09-14 NOTE — PROGRESS NOTE ADULT - ASSESSMENT
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   MRI Brain w/ contrast shows decreased enhancement and enlarged ventricles. CSF with slightly elevated proteins. pending cyometry, cytopathology.

## 2017-09-15 ENCOUNTER — TRANSCRIPTION ENCOUNTER (OUTPATIENT)
Age: 62
End: 2017-09-15

## 2017-09-15 VITALS
TEMPERATURE: 98 F | DIASTOLIC BLOOD PRESSURE: 64 MMHG | HEART RATE: 96 BPM | OXYGEN SATURATION: 99 % | SYSTOLIC BLOOD PRESSURE: 94 MMHG | RESPIRATION RATE: 18 BRPM

## 2017-09-15 PROCEDURE — 85027 COMPLETE CBC AUTOMATED: CPT

## 2017-09-15 PROCEDURE — 80053 COMPREHEN METABOLIC PANEL: CPT

## 2017-09-15 PROCEDURE — 97530 THERAPEUTIC ACTIVITIES: CPT

## 2017-09-15 PROCEDURE — 89051 BODY FLUID CELL COUNT: CPT

## 2017-09-15 PROCEDURE — 70450 CT HEAD/BRAIN W/O DYE: CPT

## 2017-09-15 PROCEDURE — 80164 ASSAY DIPROPYLACETIC ACD TOT: CPT

## 2017-09-15 PROCEDURE — 99285 EMERGENCY DEPT VISIT HI MDM: CPT | Mod: 25

## 2017-09-15 PROCEDURE — 82945 GLUCOSE OTHER FLUID: CPT

## 2017-09-15 PROCEDURE — 84157 ASSAY OF PROTEIN OTHER: CPT

## 2017-09-15 PROCEDURE — 97116 GAIT TRAINING THERAPY: CPT

## 2017-09-15 PROCEDURE — 97161 PT EVAL LOW COMPLEX 20 MIN: CPT

## 2017-09-15 PROCEDURE — 97166 OT EVAL MOD COMPLEX 45 MIN: CPT

## 2017-09-15 PROCEDURE — 72148 MRI LUMBAR SPINE W/O DYE: CPT

## 2017-09-15 PROCEDURE — 82607 VITAMIN B-12: CPT

## 2017-09-15 PROCEDURE — 70551 MRI BRAIN STEM W/O DYE: CPT

## 2017-09-15 PROCEDURE — 82746 ASSAY OF FOLIC ACID SERUM: CPT

## 2017-09-15 PROCEDURE — 88108 CYTOPATH CONCENTRATE TECH: CPT

## 2017-09-15 PROCEDURE — 93970 EXTREMITY STUDY: CPT

## 2017-09-15 PROCEDURE — 84100 ASSAY OF PHOSPHORUS: CPT

## 2017-09-15 PROCEDURE — A9585: CPT

## 2017-09-15 PROCEDURE — 83735 ASSAY OF MAGNESIUM: CPT

## 2017-09-15 PROCEDURE — 70552 MRI BRAIN STEM W/DYE: CPT

## 2017-09-15 PROCEDURE — G0515: CPT

## 2017-09-15 RX ORDER — IBUPROFEN 200 MG
1 TABLET ORAL
Qty: 0 | Refills: 0 | COMMUNITY
Start: 2017-09-15

## 2017-09-15 RX ORDER — DEXAMETHASONE 0.5 MG/5ML
1 ELIXIR ORAL
Qty: 0 | Refills: 0 | COMMUNITY
Start: 2017-09-15

## 2017-09-15 RX ORDER — DEXAMETHASONE 0.5 MG/5ML
2 ELIXIR ORAL
Qty: 0 | Refills: 0 | COMMUNITY

## 2017-09-15 RX ADMIN — Medication 4 MILLIGRAM(S): at 04:57

## 2017-09-15 RX ADMIN — DIVALPROEX SODIUM 750 MILLIGRAM(S): 500 TABLET, DELAYED RELEASE ORAL at 04:57

## 2017-09-15 RX ADMIN — Medication 100 MILLIGRAM(S): at 11:33

## 2017-09-15 RX ADMIN — OLANZAPINE 5 MILLIGRAM(S): 15 TABLET, FILM COATED ORAL at 11:33

## 2017-09-15 NOTE — DISCHARGE NOTE ADULT - MEDICATION SUMMARY - MEDICATIONS TO CHANGE
I will SWITCH the dose or number of times a day I take the medications listed below when I get home from the hospital:    Depakote 500 mg oral delayed release tablet  -- 1 tab(s) by mouth every 12 hours

## 2017-09-15 NOTE — DISCHARGE NOTE ADULT - MEDICATION SUMMARY - MEDICATIONS TO TAKE
I will START or STAY ON the medications listed below when I get home from the hospital:    dexamethasone 4 mg oral tablet  -- 1 tab(s) by mouth once a day  -- Indication: For Glioblastoma    ibuprofen 400 mg oral tablet  -- 1 tab(s) by mouth every 6 hours, As needed, Mild pain  -- Indication: For analgesia    valproic acid  -- 750 milligram(s) by mouth 2 times a day  -- Indication: For EP (Epilepsy)    OLANZapine 5 mg oral tablet  -- 1 tab(s) by mouth once a day  -- Indication: For Glioblastoma    amantadine 100 mg oral capsule  -- 1 cap(s) by mouth once a day  -- Indication: For Glioblastoma

## 2017-09-15 NOTE — DISCHARGE NOTE ADULT - PATIENT PORTAL LINK FT
“You can access the Follow[facility] Patient Portal, offered by [facility], by registering with the following website: [email or web address]”

## 2017-09-15 NOTE — PROGRESS NOTE ADULT - PROVIDER SPECIALTY LIST ADULT
Neurology

## 2017-09-15 NOTE — DISCHARGE NOTE ADULT - OTHER SIGNIFICANT FINDINGS
MRI Brain w/o:   There has been a right frontal craniotomy. There is enhancing soft tissue in  the right frontal lobe which is smaller when compared to the previous  examination. This is consistent with either residual neoplasm or post  therapy necrosis. The more peripheral area is smaller, now measuring 1.9 cm  in AP diameter x 2.1 cm transversely compared with 1.7 cm in AP diameter x  2.2 mm transversely. The more central lesion is also slightly smaller  measuring 3 cm in AP diameter x 1.9 cm transversely compared with the prior  of 3.4 cm in AP diameter x 2.3 cm transversely. The enhancement has  significantly decreased since 4/7/2017 in the more peripheral anterior  enhancement had measured 2.8 cm AP x 2.8 cm transversely. The more subtle  lesion measured 2.9 cm in AP diameter x 2.5 cm transversely.    There is slightly more enhancement crossing the midline near an area of  cystic change which is also smaller compared to the prior exam.    The ventricles have moderately enlarged since 4/9/2017 but are only mildly  enlarged compared with 7/14/2017. The left frontal parietal extra-axial  collection is significantly smaller and there is left sided dural  enhancement.    Impression: Right frontal postoperative changes with residual enhancement  which has decreased moderately since 7/14/2017. The ventricles are enlarged  which may be due to communicating hydrocephalus.    MRI Brain w/ w/o:  There has been a right frontal craniotomy. There is enhancing soft tissue in  the right frontal lobe which is smaller when compared to the previous  examination. This is consistent with either residual neoplasm or post  therapy necrosis. The more peripheral area is smaller, now measuring 1.9 cm  in AP diameter x 2.1 cm transversely compared with 1.7 cm in AP diameter x  2.2 mm transversely. The more central lesion is also slightly smaller  measuring 3 cm in AP diameter x 1.9 cm transversely compared with the prior  of 3.4 cm in AP diameter x 2.3 cm transversely. The enhancement has  significantly decreased since 4/7/2017 in the more peripheral anterior  enhancement had measured 2.8 cm AP x 2.8 cm transversely. The more subtle  lesion measured 2.9 cm in AP diameter x 2.5 cm transversely.    There is slightly more enhancement crossing the midline near an area of  cystic change which is also smaller compared to the prior exam.    The ventricles have moderately enlarged since 4/9/2017 but are only mildly  enlarged compared with 7/14/2017. The left frontal parietal extra-axial  collection is significantly smaller and there is left sided dural  enhancement.    Impression: Right frontal postoperative changes with residual enhancement  which has decreased moderately since 7/14/2017. The ventricles are enlarged  which may be due to communicating hydrocephalus.            MRI T/L Spine:  T11-T12: No spinal canal stenosis or neural foraminal narrowing.    T12-L1: No spinal canal stenosis or neural foraminal narrowing.    L1-L2: No spinal canal stenosis or neural foraminal narrowing. Mild  facet/ligamentous hypertrophy.    L2-L3: No spinal canal stenosis or neural foraminal narrowing. Mild  facet/ligamentous hypertrophy.    L3-L4: Minimal disc bulge. Mild bilateral facet/ligament hypertrophy. Mild  thecal sac compression. No significant neural foraminal narrowing.    L5-S1: Mild disc bulge. Mild bilateral facet/ligamentous hypertrophy. Mild  thecal sac compression. Mild right greater than left neural foraminal  narrowing.    L5-S1: Mild disc bulge. Mild bilateral facet/segments hypertrophy. No mass  effect upon the descending S1 nerve roots or thecal sac. Minimal bilateral  neural foraminal narrowing.    IMPRESSION: Mild multilevel degenerative changes as detailed in the body the  report.    US Duplex LE:  IMPRESSION:    No evidence of bilateral lower extremity deep venous thrombosis.

## 2017-09-15 NOTE — DISCHARGE NOTE ADULT - CARE PLAN
Goal:	Primary tumor progression prevention  Instructions for follow-up, activity and diet:	Patient should follow up with his oncologist for further management of his condition Principal Discharge DX:	Glioblastoma  Goal:	Primary tumor progression prevention  Instructions for follow-up, activity and diet:	Patient should follow up with his oncologist for further management of his condition

## 2017-09-15 NOTE — DISCHARGE NOTE ADULT - NS AS DC STROKE ED MATERIALS
Stroke Warning Signs and Symptoms/Call 911 for Stroke/Prescribed Medications/Need for Followup After Discharge/Risk Factors for Stroke/Stroke Education Booklet

## 2017-09-15 NOTE — DISCHARGE NOTE ADULT - CARE PROVIDERS DIRECT ADDRESSES
,[email or web address],DirectAddress_Unknown

## 2017-09-15 NOTE — DISCHARGE NOTE ADULT - PLAN OF CARE
Primary tumor progression prevention Patient should follow up with his oncologist for further management of his condition

## 2017-09-15 NOTE — DISCHARGE NOTE ADULT - CARE PROVIDER_API CALL
Arturo Anderson), Neurology  [address] [place] [address]  Phone: [phone]  Fax: [phone]    Jeniffer Call), Neurology  [facility]  [address] [place] [address]  Phone: [phone]  Fax: [phone]

## 2017-09-15 NOTE — DISCHARGE NOTE ADULT - HOSPITAL COURSE
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 Had lower extremity dopplers that were negative for DVT. States that he had alerted Dr. Julia KOCH about the swelling he also had and was instructed to take half of his 4mg dose of decadron. Denies any new weakness of upper extremities.  No recent seizures but wife states his personality has changed recently where he "acts out" more than usual to which the patient laughed. Denies any crying or laughing at inappropriate situations at home. NP had suggested possibly increasing his olanzapine.  At baseline, patient ambulates with walker and is still having instability despite this    Patient was admitted for workup of increasing falls, with associated LE pain.  Since admission, patient has been followed by neuro-oncology who recommend continuing his steroid regimen while inpatient and experiencing symptoms.  He was evaluated by physical therapy who recommended subacute rehabilitation.  He has been stable since admission, currently experiencing less pain, but continuing to have gait disturbance.  Discussions with family regarding therapy for his glioma, were that they did not wish to pursue any further chemo-radiation therapy at this time.      He is medically stable for transfer to subacute rehab facility 62 year old male with right frontal anaplastic glioma presents with 2 weeks of increasing falls and at least 1 week of bilateral lower extremity pain. Had lower extremity dopplers that were negative for DVT. States that he had alerted Dr. Julia KOCH about the swelling he also had and was instructed to take half of his 4mg dose of decadron. Denies any new weakness of upper extremities.  No recent seizures but wife states his personality has changed recently where he "acts out" more than usual to which the patient laughed. Denies any crying or laughing at inappropriate situations at home. NP had suggested possibly increasing his olanzapine.  At baseline, patient ambulates with walker and is still having instability despite this    Patient was admitted for workup of increasing falls, with associated LE pain.  Since admission, patient has been followed by neuro-oncology who recommend continuing his steroid regimen while inpatient and experiencing symptoms.  He was evaluated by physical therapy who recommended subacute rehabilitation.  He has been stable since admission, currently experiencing less pain, but continuing to have gait disturbance.      Patient will not be getting any chemo-radiation therapy during his rehabilitation therapy.   He is medically stable for transfer to subacute rehab facility 62 year old male with right frontal anaplastic glioma presents with 2 weeks of increasing falls and at least 1 week of bilateral lower extremity pain. Had lower extremity dopplers that were negative for DVT. States that he had alerted Dr. Dumont NP about the swelling he also had and was instructed to take half of his 4mg dose of decadron. Denies any new weakness of upper extremities.  No recent seizures but wife states his personality has changed recently where he "acts out" more than usual to which the patient laughed. Denies any crying or laughing at inappropriate situations at home. NP had suggested possibly increasing his olanzapine.  At baseline, patient ambulates with walker and is still having instability despite this.    Patient was admitted for workup of increasing falls, with associated LE pain. Differential included bifrontal lobe injury vs hydrocephalus.MRI Brain w/ w/o was performed which did not show tumor progression but supports diagnosis of radiation effects. He got MRI T/L spine which showed no pathology to explain his weakness. LP was performed with elevated protein (55). Cytology revealed rare degenerated and ill preserved cells of uncertain histogenesis. There was concern for possible hydrocephalus, during LP 35cc CSF was removed. Pt was noted to have little improvement, unclear if as result of spinal tap. Would recommend outpatient follow up with [facility] for possible VPS placement. Steroids were recommended by neuro-oncology, Dr. Call, to continue Decadron 4mg daily and follow up in 1 month.    Since admission, patient has been followed by neuro-oncology who recommend continuing his steroid regimen while inpatient and experiencing symptoms.  He was evaluated by physical therapy who recommended subacute rehabilitation.  He has been stable since admission, currently experiencing less pain, but continuing to have gait disturbance.      Patient will not be getting any chemo-radiation therapy during his rehabilitation therapy.   He is medically stable for transfer to subacute rehab facility    Recommendations:  - c/w Decadron 4mg daily   - /750  - c/w rest of home medications as in med rec on discharge.   - follow up with neuro-onc Dr. Anderson/Dr. Call in one month.

## 2017-09-15 NOTE — PROGRESS NOTE ADULT - SUBJECTIVE AND OBJECTIVE BOX
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   MRI Brain w/ contrast shows decreased enhancement and enlarged ventricles. CSF with slightly elevated proteins. pending cyometry, cytopathology.    Subjective  Patient seen and examined. No events overnight, states feels well.        MEDICATIONS  (STANDING):  amantadine 100 milliGRAM(s) Oral daily  OLANZapine 5 milliGRAM(s) Oral daily  enoxaparin Injectable 40 milliGRAM(s) SubCutaneous every 24 hours  diVALproex  milliGRAM(s) Oral two times a day  diazepam  Injectable 2 milliGRAM(s) IV Push once  dexamethasone     Tablet 3 milliGRAM(s) Oral daily    MEDICATIONS  (PRN):  ibuprofen  Tablet 400 milliGRAM(s) Oral every 6 hours PRN Mild pain  bisacodyl 5 milliGRAM(s) Oral daily PRN Constipation  senna 2 Tablet(s) Oral at bedtime PRN Constipation      Objective  Physical exam          Mental Status: flat, frontal affect, awake and alert, oriented to person, place, month, year, psychomotor slowing  (+) L facial and drift, no dysarthria, visual fields intact  (+) left hemiparesis  (+) bilateral LE weakness Left worse than Right  Left hand 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

## 2017-09-15 NOTE — PROGRESS NOTE ADULT - PROBLEM SELECTOR PROBLEM 1
Weakness of both legs

## 2017-09-28 ENCOUNTER — APPOINTMENT (OUTPATIENT)
Dept: MRI IMAGING | Facility: IMAGING CENTER | Age: 62
End: 2017-09-28

## 2017-09-28 ENCOUNTER — APPOINTMENT (OUTPATIENT)
Dept: NEUROLOGY | Facility: CLINIC | Age: 62
End: 2017-09-28
Payer: COMMERCIAL

## 2017-09-28 VITALS — DIASTOLIC BLOOD PRESSURE: 80 MMHG | HEART RATE: 68 BPM | SYSTOLIC BLOOD PRESSURE: 110 MMHG | RESPIRATION RATE: 16 BRPM

## 2017-09-28 DIAGNOSIS — C71.9 MALIGNANT NEOPLASM OF BRAIN, UNSPECIFIED: ICD-10-CM

## 2017-09-28 PROCEDURE — ZZZZZ: CPT

## 2017-09-28 RX ORDER — DEXAMETHASONE 4 MG/1
4 TABLET ORAL
Qty: 30 | Refills: 2 | Status: ACTIVE | COMMUNITY
Start: 2017-07-18

## 2017-10-30 ENCOUNTER — OUTPATIENT (OUTPATIENT)
Dept: OUTPATIENT SERVICES | Facility: HOSPITAL | Age: 62
LOS: 1 days | Discharge: HOME | End: 2017-10-30

## 2017-10-30 DIAGNOSIS — D64.9 ANEMIA, UNSPECIFIED: ICD-10-CM

## 2017-11-02 ENCOUNTER — APPOINTMENT (OUTPATIENT)
Dept: HEMATOLOGY ONCOLOGY | Facility: CLINIC | Age: 62
End: 2017-11-02
Payer: COMMERCIAL

## 2017-11-02 VITALS
SYSTOLIC BLOOD PRESSURE: 134 MMHG | HEART RATE: 93 BPM | BODY MASS INDEX: 21.43 KG/M2 | DIASTOLIC BLOOD PRESSURE: 79 MMHG | OXYGEN SATURATION: 100 % | TEMPERATURE: 96.4 F | RESPIRATION RATE: 16 BRPM | WEIGHT: 158 LBS

## 2017-11-02 DIAGNOSIS — D49.6 NEOPLASM OF UNSPECIFIED BEHAVIOR OF BRAIN: ICD-10-CM

## 2017-11-02 DIAGNOSIS — Z51.5 ENCOUNTER FOR PALLIATIVE CARE: ICD-10-CM

## 2017-11-02 PROCEDURE — 99215 OFFICE O/P EST HI 40 MIN: CPT

## 2017-11-19 PROBLEM — Z51.5 PALLIATIVE CARE BY SPECIALIST: Status: ACTIVE | Noted: 2017-05-09

## 2017-12-12 NOTE — ED ADULT NURSE NOTE - PT NEEDS ASSIST
Pallavi checking on previous message.    Ishaan [facility]  [phone]   yes

## 2017-12-26 ENCOUNTER — CHART COPY (OUTPATIENT)
Age: 62
End: 2017-12-26

## 2018-04-13 NOTE — OCCUPATIONAL THERAPY INITIAL EVALUATION ADULT - ADDITIONAL COMMENTS
HPI     Triage/(ENT) pt  Patient here for OD swollen shut x 1 day ago.(hit w/baseball)  1-2 on pain scale.    No eye drops.    I have personally interviewed the patient, reviewed the history and   examined the patient and agree with the technician's exam.    CT from 4/12/2018 reviewed, shows no evidence of entrapment of orbital   tissues in the inferior and medial right orbital fractures.    Last edited by Horacio Matamoros MD on 4/13/2018  2:08 PM. (History)            Assessment /Plan     For exam results, see Encounter Report.    Closed fracture of right orbital floor with routine healing    Medial orbital wall fracture, closed, initial encounter    Other orders  -     sulfamethoxazole-trimethoprim 400-80mg (BACTRIM,SEPTRA) 400-80 mg per tablet; Take 1 tablet by mouth 2 (two) times daily.  Dispense: 20 tablet; Refill: 0      No blowing nose.  Cough or sneeze through mouth.  Use Afrin nasal spray if nose congested.  Cold pack to injured area.  Sleep with head elevated 40 degrees.  Return one week.  Tylenol only for pain, no non-steroidal medications.              CT Head 9/5 Status post right frontal craniotomy with mixed density and calcification in the right frontal lobe likely representing postoperative changes versus residual neoplasm. No change in bifrontal white matter   lucency. Small left frontal subdural collection unchanged. No acute intracranial hemorrhage. Further evaluation with enhanced MRI may be obtained to further evaluate neoplastic involvement

## 2018-09-17 NOTE — PATIENT PROFILE ADULT. - FALL HARM RISK
HPI     Diabetic Eye Exam      Additional comments: Yearly              Comments     Last seen by [facility] on 9/12/17 for yearly eye exam  Patient here today for yearly DM eye exam  Wears SVL glasses full-time  Blood sugar stable 82 this morning  No other complaints  No drops  1. DM x2005  2. Family H/o glaucoma- Sister          Last edited by Radha Curry, PCT on 9/17/2018  4:00 PM. (History)              Assessment /Plan     For exam results, see Encounter Report.    Type 2 diabetes mellitus without retinopathy    Hypertension associated with diabetes    Cataract cortical, senile, bilateral    Glaucoma screening    Myopia with astigmatism, bilateral    Cataract accounts for change in vision. . Cataract surgery will improve vision, but vision decrease is not currently affecting activities of daily living. Patient prefers to defer surgery. Pt to call back if symptoms worsen before next appointment.  No diabetic retinopathy both eyes.  Glaucoma screening negative both eyes.  Updated glasses prescription.  Patient prefers to wear single vision distance glasses and read without prescription.  Return to clinic 1 yr.                   
bones(Osteoporosis,prev fx,steroid use,metastatic bone ca

## 2021-08-25 NOTE — ED PROVIDER NOTE - PROGRESS NOTE DETAILS
case d/w neurology, likely dilantin toxicity will admit to neuro. Topical Retinoid counseling:  Patient advised to apply a pea-sized amount only at bedtime and wait 30 minutes after washing their face before applying.  If too drying, patient may add a non-comedogenic moisturizer. The patient verbalized understanding of the proper use and possible adverse effects of retinoids.  All of the patient's questions and concerns were addressed.

## 2021-10-05 NOTE — ED CLERICAL - DIVISION
Please place on 11-22 in one of the available consult slots is fine.     Advise when scheduled   [facility]...

## 2024-10-07 NOTE — PHYSICAL THERAPY INITIAL EVALUATION ADULT - FINE MOTOR COORDINATION, RIGHT HAND THUMB/FINGER OPPOSITION SKILLS, OT EVAL
Spoke to patient to remind them of upcoming appointment.  Patient made aware of time / date of appointment as well as location.    mild impairment